# Patient Record
Sex: FEMALE | Race: WHITE | NOT HISPANIC OR LATINO | Employment: FULL TIME | URBAN - METROPOLITAN AREA
[De-identification: names, ages, dates, MRNs, and addresses within clinical notes are randomized per-mention and may not be internally consistent; named-entity substitution may affect disease eponyms.]

---

## 2017-05-08 ENCOUNTER — APPOINTMENT (OUTPATIENT)
Dept: RADIOLOGY | Facility: MEDICAL CENTER | Age: 57
End: 2017-05-08
Attending: EMERGENCY MEDICINE
Payer: OTHER MISCELLANEOUS

## 2017-05-08 ENCOUNTER — HOSPITAL ENCOUNTER (EMERGENCY)
Facility: MEDICAL CENTER | Age: 57
End: 2017-05-08
Attending: EMERGENCY MEDICINE
Payer: OTHER MISCELLANEOUS

## 2017-05-08 VITALS
BODY MASS INDEX: 27.32 KG/M2 | OXYGEN SATURATION: 98 % | DIASTOLIC BLOOD PRESSURE: 93 MMHG | HEIGHT: 66 IN | TEMPERATURE: 99 F | SYSTOLIC BLOOD PRESSURE: 157 MMHG | HEART RATE: 88 BPM | WEIGHT: 170 LBS | RESPIRATION RATE: 16 BRPM
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
## 2017-05-08 DIAGNOSIS — M79.672 LEFT FOOT PAIN: ICD-10-CM

## 2017-05-08 DIAGNOSIS — S43.499A: ICD-10-CM

## 2017-05-08 PROCEDURE — 99284 EMERGENCY DEPT VISIT MOD MDM: CPT

## 2017-05-08 PROCEDURE — 700102 HCHG RX REV CODE 250 W/ 637 OVERRIDE(OP): Performed by: EMERGENCY MEDICINE

## 2017-05-08 PROCEDURE — A9270 NON-COVERED ITEM OR SERVICE: HCPCS | Performed by: EMERGENCY MEDICINE

## 2017-05-08 PROCEDURE — 307740 HCHG GREEN TRAUMA TEAM SERVICES

## 2017-05-08 PROCEDURE — 73630 X-RAY EXAM OF FOOT: CPT | Mod: LT

## 2017-05-08 PROCEDURE — 73030 X-RAY EXAM OF SHOULDER: CPT | Mod: LT

## 2017-05-08 PROCEDURE — 73560 X-RAY EXAM OF KNEE 1 OR 2: CPT | Mod: RT

## 2017-05-08 RX ORDER — IBUPROFEN 800 MG/1
800 TABLET ORAL EVERY 8 HOURS PRN
Qty: 15 TAB | Refills: 3 | Status: SHIPPED | OUTPATIENT
Start: 2017-05-08 | End: 2020-03-10

## 2017-05-08 RX ORDER — CYCLOBENZAPRINE HCL 10 MG
10 TABLET ORAL 3 TIMES DAILY PRN
Qty: 15 TAB | Refills: 0 | Status: SHIPPED | OUTPATIENT
Start: 2017-05-08

## 2017-05-08 RX ORDER — IBUPROFEN 600 MG/1
600 TABLET ORAL ONCE
Status: COMPLETED | OUTPATIENT
Start: 2017-05-08 | End: 2017-05-08

## 2017-05-08 RX ADMIN — IBUPROFEN 600 MG: 600 TABLET, FILM COATED ORAL at 15:49

## 2017-05-08 ASSESSMENT — LIFESTYLE VARIABLES: DO YOU DRINK ALCOHOL: NO
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
## 2017-05-08 NOTE — ED AVS SNAPSHOT
[employer] Access Code: [email or web address]  Expires: 6/7/2017  3:43 PM    Your email address is not on file at [employer].  Email Addresses are required for you to sign up for [employer], please contact [phone] to verify your personal information and to provide your email address prior to attempting to register for [employer].    [facility]  [address] [place] [address]    [employer]  A secure, online tool to manage your health information     [employer]’s [employer]® is a secure, online tool that connects you to your personalized health information from the privacy of your home -- day or night - making it very easy for you to manage your healthcare. Once the activation process is completed, you can even access your medical information using the [employer] sunil, which is available for free in the Apple Sunil store or Google Play store.     To learn more about [employer], visit [email or web address]    There are two levels of access available (as shown below):   My Chart Features  [facility] Primary Care Doctor [facility]  Specialists [facility]  Urgent  Care Non-[facility] Primary Care Doctor   Email your healthcare team securely and privately 24/7 X X X    Manage appointments: schedule your next appointment; view details of past/upcoming appointments X      Request prescription refills. X      View recent personal medical records, including lab and immunizations X X X X   View health record, including health history, allergies, medications X X X X   Read reports about your outpatient visits, procedures, consult and ER notes X X X X   See your discharge summary, which is a recap of your hospital and/or ER visit that includes your diagnosis, lab results, and care plan X X  X     How to register for L[employer]:  Once your e-mail address has been verified, follow the following steps to sign up for [employer].     1. Go to  [email or web address]  2. Click on the Sign Up Now box, which takes you to the New Member Sign Up page. You  will need to provide the following information:  a. Enter your [employer] Access Code exactly as it appears at the top of this page. (You will not need to use this code after you’ve completed the sign-up process. If you do not sign up before the expiration date, you must request a new code.)   b. Enter your date of birth.   c. Enter your home email address.   d. Click Submit, and follow the next screen’s instructions.  3. Create a Undat ID. This will be your [employer] login ID and cannot be changed, so think of one that is secure and easy to remember.  4. Create a [employer] password. You can change your password at any time.  5. Enter your Password Reset Question and Answer. This can be used at a later time if you forget your password.   6. Enter your e-mail address. This allows you to receive e-mail notifications when new information is available in [employer].  7. Click Sign Up. You can now view your health information.    For assistance activating your [employer] account, call [phone]

## 2017-05-08 NOTE — ED AVS SNAPSHOT
5/8/2017    Zoran Neumannarm  [address] [place] [address]    Dear Zoran:    [facility] wants to ensure your discharge home is safe and you or your loved ones have had all of your questions answered regarding your care after you leave the hospital.    Below is a list of resources and contact information should you have any questions regarding your hospital stay, follow-up instructions, or active medical symptoms.    Questions or Concerns Regarding… Contact   Medical Questions Related to Your Discharge  (7 days a week, 8am-5pm) Contact a Nurse Care Coordinator   [phone]   Medical Questions Not Related to Your Discharge  (24 hours a day / 7 days a week)  Contact the Nurse Health Line   [phone]    Medications or Discharge Instructions Refer to your discharge packet   or contact your [facility] Primary Care Provider   [phone]   Follow-up Appointment(s) Schedule your appointment via GoVoluntr   or contact Scheduling [phone]   Billing Review your statement via GoVoluntr  or contact Billing [phone]   Medical Records Review your records via GoVoluntr   or contact Medical Records [phone]     You may receive a telephone call within two days of discharge. This call is to make certain you understand your discharge instructions and have the opportunity to have any questions answered. You can also easily access your medical information, test results and upcoming appointments via the GoVoluntr free online health management tool. You can learn more and sign up at Mobile Event Guide/GoVoluntr. For assistance setting up your GoVoluntr account, please call [phone].    Once again, we want to ensure your discharge home is safe and that you have a clear understanding of any next steps in your care. If you have any questions or concerns, please do not hesitate to contact us, we are here for you. Thank you for choosing [facility] for your healthcare needs.    Sincerely,    Your [facility] Healthcare Team

## 2020-03-10 ENCOUNTER — HOSPITAL ENCOUNTER (EMERGENCY)
Facility: MEDICAL CENTER | Age: 60
End: 2020-03-10
Attending: EMERGENCY MEDICINE
Payer: COMMERCIAL

## 2020-03-10 ENCOUNTER — APPOINTMENT (OUTPATIENT)
Dept: RADIOLOGY | Facility: MEDICAL CENTER | Age: 60
End: 2020-03-10
Attending: EMERGENCY MEDICINE
Payer: COMMERCIAL

## 2020-03-10 VITALS
TEMPERATURE: 97.7 F | SYSTOLIC BLOOD PRESSURE: 162 MMHG | HEART RATE: 69 BPM | OXYGEN SATURATION: 96 % | BODY MASS INDEX: 28.17 KG/M2 | WEIGHT: 169.09 LBS | HEIGHT: 65 IN | DIASTOLIC BLOOD PRESSURE: 88 MMHG | RESPIRATION RATE: 16 BRPM

## 2020-03-10 DIAGNOSIS — S62.002A CLOSED NONDISPLACED FRACTURE OF SCAPHOID OF LEFT WRIST, UNSPECIFIED PORTION OF SCAPHOID, INITIAL ENCOUNTER: ICD-10-CM

## 2020-03-10 DIAGNOSIS — S69.92XA INJURY OF LEFT WRIST, INITIAL ENCOUNTER: ICD-10-CM
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
## 2020-03-10 PROCEDURE — A9270 NON-COVERED ITEM OR SERVICE: HCPCS | Performed by: EMERGENCY MEDICINE

## 2020-03-10 PROCEDURE — 99284 EMERGENCY DEPT VISIT MOD MDM: CPT

## 2020-03-10 PROCEDURE — 700102 HCHG RX REV CODE 250 W/ 637 OVERRIDE(OP): Performed by: EMERGENCY MEDICINE

## 2020-03-10 PROCEDURE — 73110 X-RAY EXAM OF WRIST: CPT | Mod: LT

## 2020-03-10 RX ORDER — IBUPROFEN 400 MG/1
400 TABLET ORAL EVERY 8 HOURS PRN
Qty: 20 TAB | Refills: 0 | Status: SHIPPED | OUTPATIENT
Start: 2020-03-10

## 2020-03-10 RX ORDER — IBUPROFEN 200 MG
400 TABLET ORAL ONCE
Status: COMPLETED | OUTPATIENT
Start: 2020-03-10 | End: 2020-03-10

## 2020-03-10 RX ORDER — ACETAMINOPHEN 500 MG
1000 TABLET ORAL ONCE
Status: COMPLETED | OUTPATIENT
Start: 2020-03-10 | End: 2020-03-10

## 2020-03-10 RX ORDER — ACETAMINOPHEN 500 MG
1000 TABLET ORAL EVERY 8 HOURS PRN
Qty: 20 TAB | Refills: 0 | Status: SHIPPED | OUTPATIENT
Start: 2020-03-10

## 2020-03-10 RX ADMIN — ACETAMINOPHEN 1000 MG: 500 TABLET ORAL at 20:22

## 2020-03-10 RX ADMIN — IBUPROFEN 400 MG: 200 TABLET, FILM COATED ORAL at 20:22

## 2020-03-10 ASSESSMENT — LIFESTYLE VARIABLES: DO YOU DRINK ALCOHOL: NO

## 2020-03-10 NOTE — LETTER
"  FORM C-4:  EMPLOYEE’S CLAIM FOR COMPENSATION/ REPORT OF INITIAL TREATMENT  EMPLOYEE’S CLAIM - PROVIDE ALL INFORMATION REQUESTED   First Name Zoran Last Name Charlotte Birthdate 1960  Sex female Claim Number   Home Employee Address [address]  [facility]                                     Zip  [address] Height  1.651 m (5' 5\") Weight  76.7 kg (169 lb 1.5 oz) [facility]     Mailing Employee Address [address]   [facility] [place]               Zip  [address] Telephone  [phone] (home)  Primary Language Spoken   Insurer   Third Party   HOSSEIN CLAIMS [id number] 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 Be specific and answer in detail. Use additional sheet if necessary)  Slipped on water at work + fell on wrist.   If you believe that you have an occupational disease, when did you first have knowledge of the disability and it relationship to your employment? N/A Witnesses to the Accident  [place]ornn[place]   Nature of Injury or Occupational Disease  Workers' Compensation Part(s) of Body Injured or Affected  Wrist (L) and Hand (L), N/A, N/A    I CERTIFY THAT THE ABOVE IS TRUE AND CORRECT TO THE BEST OF MY KNOWLEDGE AND THAT I HAVE PROVIDED THIS INFORMATION IN ORDER TO OBTAIN THE BENEFITS OF [place] IN[place]).  I HEREBY AUTHORIZE ANY PHYSICIAN, CHIROPRACTOR, SURGEON, PRACTITIONER, OR OTHER PERSON, ANY HOSPITAL, INCLUDING [facility] OR [facility], ANY MEDICAL SERVICE ORGANIZATION, ANY INSURANCE COMPANY, OR OTHER INSTITUTION OR ORGANIZATION TO RELEASE TO EACH OTHER, ANY MEDICAL OR OTHER INFORMATION, INCLUDING BENEFITS PAID OR PAYABLE, PERTINENT TO THIS INJURY OR DISEASE, EXCEPT INFORMATION RELATIVE TO DIAGNOSIS, TREATMENT AND/OR COUNSELING FOR AIDS, PSYCHOLOGICAL CONDITIONS, ALCOHOL OR CONTROLLED SUBSTANCES, FOR WHICH I MUST GIVE SPECIFIC AUTHORIZATION.  A PHOTOSTAT OF THIS AUTHORIZATION SHALL BE AS VALID AS THE ORIGINAL.  Date 03/10/2020                  [facility]                           Employee’s Signature   THIS REPORT MUST BE COMPLETED AND MAILED WITHIN 3 WORKING DAYS OF TREATMENT   [facility], EMERGENCY DEPT                                                                             Name of Facility [facility]   Date  3/10/2020 Diagnosis  (W18.30XA) Fall from ground level  (S69.92XA) Injury of left wrist, initial encounter  (S62.002A) Closed nondisplaced fracture of scaphoid of left wrist, unspecified portion of scaphoid, initial encounter Is there evidence the injured employee was under the influence of alcohol and/or another controlled substance at the time of accident?   Hour  8:22 PM Description of Injury or Disease  Fall from ground level  Injury of left wrist, initial encounter  Closed nondisplaced fracture of scaphoid of left wrist, unspecified portion of scaphoid, initial encounter     Treatment  Slip and fall on wet floor at work onto outstretched left hand, x-ray thankfully shows no obvious broken bones or dislocations we will plan splint and follow-up in 1 week for possible occult scaphoid fracture  Have you advised the patient to remain off work five days or more?       "   No   X-Ray Findings  Negative  Comments:Negative for any fracture dislocation but tenderness to the wrist over the scaphoid bone suspicious for occult scaphoid fracture If Yes   From Date    To Date      From information given by the employee, together with medical evidence, can you directly connect this injury or occupational disease as job incurred? Yes If No, is employee capable of: Full Duty  No Modified Duty  Yes   Is additional medical care by a physician indicated? Yes  Comments:Recommend repeat x-ray in 1 week If Modified Duty, Specify any Limitations / Restrictions   Left hand/wrist needs to be immobilized in splint for 1 week   Do you know of any previous injury or disease contributing to this condition or occupational disease? No    Date 3/10/2020 Print Doctor’s Name Victoriano Mccarthy certify the employer’s copy of this form was mailed on:   Address [address] [place] [address] [id number] [phone] INSURER’S USE ONLY   Provider’s Tax ID Number [id number] Telephone Dept: [phone]    Doctor’s Signature e-SignVICTORIANO MCCARTHY M.D. Degree  MD      Form C-4 (rev.10/07)                                                                         BRIEF DESCRIPTION OF RIGHTS AND BENEFITS  (Pursuant to [place]RS 616C.050)    Notice of Injury or Occupational Disease (Incident Report Form C-1): If an injury or occupational disease (OD) arises out of and in the course of employment, you must provide written notice to your employer as soon as practicable, but no later than 7 days after the accident or OD. Your employer shall maintain a sufficient supply of the required forms.    Claim for Compensation (Form C-4): If medical treatment is sought, the form C-4 is available at the place of initial treatment. A completed \"Claim for Compensation\" (Form C-4) must be filed within 90 days after an accident or OD. The treating physician or chiropractor must, within 3 working days after treatment, complete and mail to the " employer, the employer's insurer and third-party , the Claim for Compensation.    Medical Treatment: If you require medical treatment for your on-the-job injury or OD, you may be required to select a physician or chiropractor from a list provided by your workers’ compensation insurer, if it has contracted with an Organization for Managed Care (MCO) or Preferred Provider Organization (PPO) or providers of health care. If your employer has not entered into a contract with an MCO or PPO, you may select a physician or chiropractor from the Panel of Physicians and Chiropractors. Any medical costs related to your industrial injury or OD will be paid by your insurer.    Temporary Total Disability (TTD): If your doctor has certified that you are unable to work for a period of at least 5 consecutive days, or 5 cumulative days in a 20-day period, or places restrictions on you that your employer does not accommodate, you may be entitled to TTD compensation.    Temporary Partial Disability (TPD): If the wage you receive upon reemployment is less than the compensation for TTD to which you are entitled, the insurer may be required to pay you TPD compensation to make up the difference. TPD can only be paid for a maximum of 24 months.    Permanent Partial Disability (PPD): When your medical condition is stable and there is an indication of a PPD as a result of your injury or OD, within 30 days, your insurer must arrange for an evaluation by a rating physician or chiropractor to determine the degree of your PPD. The amount of your PPD award depends on the date of injury, the results of the PPD evaluation and your age and wage.    Permanent Total Disability (PTD): If you are medically certified by a treating physician or chiropractor as permanently and totally disabled and have been granted a PTD status by your insurer, you are entitled to receive monthly benefits not to exceed 66 2/3% of your average monthly wage. The  amount of your PTD payments is subject to reduction if you previously received a PPD award.    Vocational Rehabilitation Services: You may be eligible for vocational rehabilitation services if you are unable to return to the job due to a permanent physical impairment or permanent restrictions as a result of your injury or occupational disease.    Transportation and Per Bayron Reimbursement: You may be eligible for travel expenses and per bayron associated with medical treatment.    Reopening: You may be able to reopen your claim if your condition worsens after claim closure.     Appeal Process: If you disagree with a written determination issued by the insurer or the insurer does not respond to your request, you may appeal to the Department of Administration, , by following the instructions contained in your determination letter. You must appeal the determination within 70 days from the date of the determination letter at [address], [address], [place], [place] [address], or [address], [address], [place], [place] [address]. If you disagree with the  decision, you may appeal to the Department of Administration, . You must file your appeal within 30 days from the date of the  decision letter at [address], [address], [place], [place] [address], or [address], [address], [place], [place] [address]. If you disagree with a decision of an , you may file a petition for judicial review with the District Court. You must do so within 30 days of the Appeal Officer’s decision. You may be represented by an  at your own expense or you may contact the [facility] for possible representation.    [place]  for Injured Workers (NAIW): If you disagree with a  decision, you may request that NAIW represent you without charge at an  Hearing. For information regarding denial of benefits, you may  contact the [facility] at: [address], [address], [place] [address], [phone], or [address], [place] [address], [phone]    To File a Complaint with the Division: If you wish to file a complaint with the  of the Division of Industrial Relations (DIR),  please contact the Workers’ Compensation Section, [address], [address], [place], [place] [address], telephone ([phone]) [phone], or [address], [address], [place], [place] [address], telephone [phone].    For assistance with Workers’ Compensation Issues: You may contact the Office of the Governor Consumer Health Assistance, [address], [place], [place] [address], Toll Free 1-227.773.2562, Web site: http://govcha.[facility].nv., E-mail [email or web address].[email or web address].  D-2 (rev. 06/18)              __________________________________________________________________                                    ____03/10/2020____            Employee Name / Signature                                                                                                                            Date

## 2020-03-11 NOTE — DISCHARGE INSTRUCTIONS
You were seen and evaluated in the Emergency Department at [facility] for:     Left wrist pain after a fall    You had the following tests and studies:    Thankfully x-ray shows no broken bones or dislocations, but sometimes there is a bone in your wrist called the scaphoid bone which can not show up on x-rays for up to 1 week.    You received the following medications:    Acetaminophen and ibuprofen.    You received the following prescriptions:    Acetaminophen and ibuprofen, take as prescribed.  ----------------------------    Please make sure to follow up with:    Your occupational health provider and/or Dr. Becerra from orthopedics for repeat x-ray in 1 week, but if you have any worsening pain or swelling or fevers or any other concerns return to the ER immediately.    Good luck, we hope you get better soon!  Use the splint provided until you are pain-free and have a repeat x-ray.  ----------------------------    We always encourage patients to return IMMEDIATELY if they have:  Increased or changing pain, passing out, fevers over 100.4 (taken in your mouth or rectally) for more than 2 days, redness or swelling of skin or tissues, feeling like your heart is beating fast, chest pain that is new or worsening, trouble breathing, feeling like your throat is closing up and can not breath, inability to walk, weakness of any part of your body, new dizziness, severe bleeding that won't stop from any part of your body, if you can't eat or drink, or if you have any other concerns.   If you feel worse, please know that you can always return with any questions, concerns, worse symptoms, or you are feeling unsafe. We certainly cannot say for sure that we have ruled out every illness or dangerous disease, but we feel that at this specific time, your exam, tests, and vital signs like heart rate and blood pressure are safe for discharge.

## 2020-03-11 NOTE — ED PROVIDER NOTES
"ED PROVIDER NOTE     Scribed for Victoriano Noe M.D. by Nichol Zuñiga. 3/10/2020, 7:36 PM.    CHIEF COMPLAINT  Chief Complaint   Patient presents with   • T-5000 Extremity Pain   • T-5000 GLF       HPI    Primary care provider: Not on file  Means of arrival: walk in  History obtained from: Patient  History limited by: None    Zoran Porter is a 59 y.o. female with a history of hypertension, diabetes, and hyperlipidemia who presents to the ED for evaluation following a mechanical ground level fall which occurred around 6 PM tonight at work. The patient notes the fall occurred after she slipped on a puddle of water on the floor and landed on her out stretched left hand. She reports onset of left wrist pain following the incident, but denies any head trauma or loss of consciousness. The patient has no associated symptoms of numbness or elbow pain. She is right hand dominant. The patient does have a history of diabetes, but reports her most recent A1C was 5.6.     REVIEW OF SYSTEMS  Constitutional: Negative for loss of consciousness.   Musculoskeletal: Positive for left wrist pain. Negative for elbow pain.   Neurological: Negative for numbness.     PAST MEDICAL HISTORY   has a past medical history of Diabetes, Diabetes (CMS-HCC), Hyperlipidemia, and Hypertension.    PAST FAMILY HISTORY  No family history on file.    SOCIAL HISTORY  Social History     Tobacco Use   • Smoking status: Never Smoker   Substance and Sexual Activity   • Alcohol use: No   • Drug use: No   • Sexual activity: None noted       SURGICAL HISTORY   has a past surgical history that includes other cardiac surgery.    CURRENT MEDICATIONS  Home Medications    **Home medications have not yet been reviewed for this encounter**         ALLERGIES  Allergies   Allergen Reactions   • Nkda [No Known Drug Allergy]        PHYSICAL EXAM  VITAL SIGNS: /81   Pulse 87   Temp 36.5 °C (97.7 °F) (Temporal)   Resp 14   Ht 1.651 m (5' 5\")   Wt 76.7 kg " (169 lb 1.5 oz)   SpO2 97%   BMI 28.14 kg/m²    Pulse ox interpretation: On room air, I interpret this pulse ox as normal.  Constitutional: No distress. Well-nourished.  HENT: Head is atraumatic. Mucous membranes moist.   Eyes: Conjunctivae are normal. EOMI.   Respiratory: No respiratory distress, wheezes, or rhonchi.    Cardiovascular: RRR, no m/r/g.  Abdomen: Soft, nontender.  Musculoskeletal: Left wrist tenderness, no snuffbox tenderness, normal sensation and capillary refill, no open wounds.   Neurological: Alert. No focal weakness or asymmetry.   Skin: No rash. No Pallor.   Psych: Appropriate mood. Normal affect.    DIAGNOSTIC STUDIES / PROCEDURES    RADIOLOGY  DX-WRIST-COMPLETE 3+ LEFT   Final Result      No evidence of fracture or dislocation.        The radiologist's interpretations of all radiological studies have been reviewed by me.          COURSE & MEDICAL DECISION MAKING    This is a 59 y.o. female who presents with left wrist pain after slip and fall at work onto outstretched hand.     Differential Diagnosis includes but is not limited to:  Fall, contusion, fracture, sprain, dislocation    ED Course:  7:37 PM Patient presents to the ED with left wrist pain. Physical exam indicates left wrist tenderness, but the patient is neurovascularly intact. Will order DX-Wrist (left) to evaluate. Patient will be treated with Tylenol 1000 mg and Motrin 400 mg for pain.     8:10 PM - Reviewed x-ray imaging which shows no sign of fracture or dislocation. Wrist pain present plan presumed occult scaphoid fracture, splint, recheck 1 week.     8:11 PM - Patient was reevaluated at bedside. She is resting comfortably. Vitals are stable. Discussed x-ray results with the patient and informed her there are no signs of fracture or dislocation, but there is still a concern for possible scaphoid fracture. The patient was informed she will be placed in a cock-up splint and will require follow up with Orthopedics/Occupational  Health in one week. She is recommended to continue taking NSAIDs as needed to manage the pain. The patient is instructed to return to the ED for worsening wrist pain, fevers, or any other concerning symptoms. She is understanding and agreeable to discharge.     Medications   acetaminophen (TYLENOL) tablet 1,000 mg (1,000 mg Oral Given 3/10/20 2022)   ibuprofen (MOTRIN) tablet 400 mg (400 mg Oral Given 3/10/20 2022)       FINAL IMPRESSION  1. Fall from ground level    2. Injury of left wrist, initial encounter    3. Closed nondisplaced fracture of scaphoid of left wrist, unspecified portion of scaphoid, initial encounter        PRESCRIPTIONS  Discharge Medication List as of 3/10/2020  8:28 PM      START taking these medications    Details   acetaminophen (TYLENOL) 500 MG Tab Take 2 Tabs by mouth every 8 hours as needed for Moderate Pain., Disp-20 Tab, R-0, Print Rx Paper             FOLLOW UP  [facility] Emergency Dept  [address] [facility] [address] [id number] [phone]  Today  If you have ANY new or worse symptoms!    [address] [id number] [phone]  Schedule an appointment as soon as possible for a visit in 1 week          -DISCHARGE-       The patient is referred to a primary physician for blood pressure management, diabetic screening, and for all other preventative health concerns.     Pertinent Imaging studies reviewed and verified by myself, as well as nursing notes and the patient's past medical, family, and social histories (See chart for details).    Results, exam findings, clinical impression, presumed diagnosis, treatment options, and strict return precautions were discussed with the patient and family, and they verbalized understanding, agreed with, and appreciated the plan of care.    INichol (Scribe), am scribing for, and in the presence of, Victoriano Noe M.D..    Electronically signed by: Nichol Zuñiga  (Scribe), 3/10/2020    IVictoriano M.D. personally performed the services described in this documentation, as scribed by Nichol Zuñiga in my presence, and it is both accurate and complete.    E.    Portions of this record were made with voice recognition software.  Despite my review, spelling/grammar/context errors may still remain.  Interpretation of this chart should be taken in this context.    The note accurately reflects work and decisions made by me.  Victoriano Noe M.D.  3/11/2020  10:30 AM

## 2020-03-11 NOTE — ED NOTES
Velcro splint applied to L wrist, CMS+, cap refill <2 sec.  Patient educated on donning and doffing, verbalized understanding.

## 2020-03-17 ENCOUNTER — APPOINTMENT (OUTPATIENT)
Dept: RADIOLOGY | Facility: IMAGING CENTER | Age: 60
End: 2020-03-17
Attending: NURSE PRACTITIONER
Payer: COMMERCIAL

## 2020-03-17 ENCOUNTER — OCCUPATIONAL MEDICINE (OUTPATIENT)
Dept: URGENT CARE | Facility: CLINIC | Age: 60
End: 2020-03-17
Payer: COMMERCIAL

## 2020-03-17 VITALS
DIASTOLIC BLOOD PRESSURE: 70 MMHG | RESPIRATION RATE: 14 BRPM | BODY MASS INDEX: 27.49 KG/M2 | OXYGEN SATURATION: 98 % | SYSTOLIC BLOOD PRESSURE: 104 MMHG | WEIGHT: 165 LBS | HEIGHT: 65 IN | HEART RATE: 78 BPM
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
## 2020-03-17 DIAGNOSIS — S62.002D CLOSED NONDISPLACED FRACTURE OF SCAPHOID OF LEFT WRIST WITH ROUTINE HEALING, UNSPECIFIED PORTION OF SCAPHOID, SUBSEQUENT ENCOUNTER: ICD-10-CM

## 2020-03-17 PROCEDURE — 73110 X-RAY EXAM OF WRIST: CPT | Mod: TC,LT | Performed by: NURSE PRACTITIONER

## 2020-03-17 PROCEDURE — 73130 X-RAY EXAM OF HAND: CPT | Mod: TC,LT | Performed by: NURSE PRACTITIONER

## 2020-03-17 PROCEDURE — 99204 OFFICE O/P NEW MOD 45 MIN: CPT | Mod: 29 | Performed by: NURSE PRACTITIONER

## 2020-03-17 ASSESSMENT — ENCOUNTER SYMPTOMS
RESPIRATORY NEGATIVE: 1
MYALGIAS: 1
CONSTITUTIONAL NEGATIVE: 1
NEUROLOGICAL NEGATIVE: 1
CARDIOVASCULAR NEGATIVE: 1
PSYCHIATRIC NEGATIVE: 1

## 2020-03-17 ASSESSMENT — PAIN SCALES - GENERAL: PAINLEVEL: 6=MODERATE PAIN

## 2020-03-17 NOTE — LETTER
[facility]  [address]  [place] [address]  Phone:  [phone] - Fax:  [phone]   [facility] Progress Report and Disability Certification  Date of Service: 3/17/2020   No Show:  No  Date / Time of Next Visit:  Discharged/Transfer Care to Orthopedics   Claim Information   Patient Name: Zoran Porter  Claim Number:     Employer:   [employer] Date of Injury: 3/10/2020     Insurer / TPA: [employer]  ID / SSN:     Occupation: Prep  Diagnosis: The encounter diagnosis was Fall from ground level.    Medical Information   Related to Industrial Injury? Yes    Subjective Complaints:  DOI 3/10/20: Zoran Porter is a 59 y.o. female with a history of hypertension, diabetes, and hyperlipidemia who presents to the ED for evaluation following a mechanical ground level fall which occurred around 6 PM tonight at work. The patient notes the fall occurred after she slipped on a puddle of water on the floor and landed on her out stretched left hand. She reports onset of left wrist pain following the incident, but denies any head trauma or loss of consciousness. The patient has no associated symptoms of numbness or elbow pain. She is right hand dominant. The patient does have a history of diabetes, but reports her most recent A1C was 5.6.    Today mild improvement. Pain is intermittent, triggered with use, and isolated to the wrist. Patient denies numbness, tingling, or weakness. Patient is using occasional Ibuprofen with moderate relief. Patient is using ice as needed with swelling. Patient has been wearing her wrist splint most of the day with some relief of symptoms. 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 Patient has not been to see an Orthopedics for further evaluation and management of symptoms. ER visit patient was diagnosed with scaphoid fracture, not seen on x-ray. Will  repeat X-rays at this visit. X-ray results reviewed with patient. Plan of care discussed with patient.    Objective Findings: Left wrist tenderness, no snuffbox tenderness, normal sensation and capillary refill, no open wounds.  Neg erythema or warmth noted. Moderate diffuse  bruising present to the wrist and hand. ROM intact, triggers pain    3/10/2020 7:44 PM     HISTORY/REASON FOR EXAM:  Left wrist pain after fall        TECHNIQUE/EXAM DESCRIPTION AND NUMBER OF VIEWS:  4 views of the LEFT wrist.     COMPARISON:  No comparison available     FINDINGS:  Bone mineralization is normal.  There is no evidence of fracture or dislocation.  Soft tissues are normal.     IMPRESSION:     No evidence of fracture or dislocation.   Pre-Existing Condition(s):     Assessment:   Condition Same    Status: Discharged / Care Transfer  Permanent Disability:No    Plan: Diagnostics    Diagnostics: X-ray    Comments:  Follow-up in 3 weeks, if not seen by Orthopedics  Transfer care to Orthopedics, referral placed  Continue with wrist splint with small breaks as needed for comfort  Continue with ice as needed  Continue with OTC Ibuprofen as needed     Disability Information   Status: Released to Restricted Duty    From:  3/17/2020  Through:   Restrictions are: Temporary   Physical Restrictions   Sitting:    Standing:    Stooping:    Bending:      Squatting:    Walking:    Climbing:    Pushin hrs/day  Comments:Left hand/wrist   Pullin hrs/day  Comments:Left hand/wrist Other:    Reaching Above Shoulder (L):   Reaching Above Shoulder (R):       Reaching Below Shoulder (L):    Reaching Below Shoulder (R):      Not to exceed Weight Limits   Carrying(hrs):   Weight Limit(lb):   Comments:No more than 2lbs Left hand/wrist only Lifting(hrs):   Weight  Limit(lb):   Comments:No more than 2lbs Left hand/wrist only   Comments:      Repetitive Actions   Hands: i.e. Fine Manipulations from Grasping: < or = to 1 hr/day  Comments:Left hand/wrist  only   Feet: i.e. Operating Foot Controls:     Driving / Operate Machinery:     Physician Name: ADAM Del Rosario Physician Signature:   e-Signature: Dr. Filippo Morales, Medical Director   Clinic Name / Location: [address], [address]  [place] [address] Clinic Phone Number: Dept: [phone]   Appointment Time: 2:15 Pm Visit Start Time: 1:38 PM   Check-In Time:  1:33 Pm Visit Discharge Time:  3 PM   Original-Treating Physician or Chiropractor    Page 2-Insurer/TPA    Page 3-Employer    Page 4-Employee

## 2020-03-17 NOTE — PROGRESS NOTES
Subjective:      Zoran Porter is a 59 y.o. female who presents with Follow-Up ( DOI 03/10/2020 Wrist (L) and Hand (L) Better -  06)      DOI 3/10/20: Zoran Porter is a 59 y.o. female with a history of hypertension, diabetes, and hyperlipidemia who presents to the ED for evaluation following a mechanical ground level fall which occurred around 6 PM tonight at work. The patient notes the fall occurred after she slipped on a puddle of water on the floor and landed on her out stretched left hand. She reports onset of left wrist pain following the incident, but denies any head trauma or loss of consciousness. The patient has no associated symptoms of numbness or elbow pain. She is right hand dominant. The patient does have a history of diabetes, but reports her most recent A1C was 5.6.    Today mild improvement. Pain is intermittent, triggered with use, and isolated to the wrist. Patient denies numbness, tingling, or weakness. Patient is using occasional Ibuprofen with moderate relief. Patient is using ice as needed with swelling. Patient has been wearing her wrist splint most of the day with some relief of symptoms. 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 Patient has not been to see an Orthopedics for further evaluation and management of symptoms. ER visit patient was diagnosed with scaphoid fracture, not seen on x-ray. Will repeat X-rays at this visit. X-ray results reviewed with patient. Plan of care discussed with patient.      HPI    Review of Systems   Constitutional: Negative.    Respiratory: Negative.    Cardiovascular: Negative.    Musculoskeletal: Positive for joint pain and myalgias.   Skin:        Moderate bruising and swelling present wrist and hand   Neurological: Negative.    Psychiatric/Behavioral: Negative.         ROS: All systems were reviewed on intake form, form was reviewed and signed. See scanned documents in media.  "Pertinent positives and negatives included in HPI.    PMH: No pertinent past medical history to this problem  MEDS: Medications were reviewed in Epic  ALLERGIES:   Allergies   Allergen Reactions   • Nkda [No Known Drug Allergy]      SOCHX: Works as Cook at [employer]  FH: No pertinent family history to this problem       Objective:     /70   Pulse 78   Resp 14   Ht 1.651 m (5' 5\")   Wt 74.8 kg (165 lb)   SpO2 98%   BMI 27.46 kg/m²      Physical Exam  Constitutional:       General: She is not in acute distress.     Appearance: Normal appearance. She is not ill-appearing.   Cardiovascular:      Rate and Rhythm: Normal rate and regular rhythm.      Pulses: Normal pulses.   Pulmonary:      Effort: Pulmonary effort is normal.   Musculoskeletal: Normal range of motion.         General: Swelling, tenderness and signs of injury present. No deformity.   Skin:     General: Skin is warm and dry.      Capillary Refill: Capillary refill takes less than 2 seconds.      Findings: Bruising present. No erythema.   Neurological:      General: No focal deficit present.      Mental Status: She is alert and oriented to person, place, and time.      Cranial Nerves: No cranial nerve deficit.      Sensory: No sensory deficit.      Motor: No weakness.      Coordination: Coordination normal.      Gait: Gait normal.      Deep Tendon Reflexes: Reflexes normal.   Psychiatric:         Mood and Affect: Mood normal.         Behavior: Behavior normal.         Left wrist tenderness, no snuffbox tenderness, normal sensation and capillary refill, no open wounds.  Neg erythema or warmth noted. Moderate diffuse  bruising present to the wrist and hand. ROM intact, triggers pain    3/10/2020 7:44 PM     HISTORY/REASON FOR EXAM:  Left wrist pain after fall        TECHNIQUE/EXAM DESCRIPTION AND NUMBER OF VIEWS:  4 views of the LEFT wrist.     COMPARISON:  No comparison available     FINDINGS:  Bone mineralization is normal.  There " is no evidence of fracture or dislocation.  Soft tissues are normal.     IMPRESSION:     No evidence of fracture or dislocation.       Assessment/Plan:       1. Fall from ground level    - REFERRAL TO ORTHOPEDICS  - DX-HAND 3+ LEFT; Future  - DX-WRIST-COMPLETE 3+ LEFT; Future  - REFERRAL TO RADIOLOGY    2. Closed nondisplaced fracture of scaphoid of left wrist with routine healing, unspecified portion of scaphoid, subsequent encounter    Follow-up in 3 weeks, if not seen by Orthopedics   Transfer care to Orthopedics, referral placed   Continue with wrist splint with small breaks as needed for comfort   Continue with ice as needed   Continue with OTC Ibuprofen as needed

## 2020-06-06 ENCOUNTER — HOSPITAL ENCOUNTER (EMERGENCY)
Facility: MEDICAL CENTER | Age: 60
End: 2020-06-06
Attending: EMERGENCY MEDICINE
Payer: MEDICAID

## 2020-06-06 ENCOUNTER — APPOINTMENT (OUTPATIENT)
Dept: RADIOLOGY | Facility: MEDICAL CENTER | Age: 60
End: 2020-06-06
Attending: EMERGENCY MEDICINE
Payer: MEDICAID

## 2020-06-06 VITALS
SYSTOLIC BLOOD PRESSURE: 141 MMHG | TEMPERATURE: 97 F | WEIGHT: 172.18 LBS | OXYGEN SATURATION: 96 % | HEART RATE: 64 BPM | BODY MASS INDEX: 27.02 KG/M2 | HEIGHT: 67 IN | DIASTOLIC BLOOD PRESSURE: 69 MMHG | RESPIRATION RATE: 16 BRPM

## 2020-06-06 DIAGNOSIS — R10.31 RIGHT LOWER QUADRANT ABDOMINAL PAIN: ICD-10-CM

## 2020-06-06 LAB
ALBUMIN SERPL BCP-MCNC: 4.4 G/DL (ref 3.2–4.9)
ALBUMIN/GLOB SERPL: 1.6 G/DL
ALP SERPL-CCNC: 85 U/L (ref 30–99)
ALT SERPL-CCNC: 19 U/L (ref 2–50)
ANION GAP SERPL CALC-SCNC: 11 MMOL/L (ref 7–16)
APPEARANCE UR: CLEAR
AST SERPL-CCNC: 23 U/L (ref 12–45)
BASOPHILS # BLD AUTO: 0.2 % (ref 0–1.8)
BASOPHILS # BLD: 0.01 K/UL (ref 0–0.12)
BILIRUB SERPL-MCNC: 0.4 MG/DL (ref 0.1–1.5)
BILIRUB UR QL STRIP.AUTO: NEGATIVE
BUN SERPL-MCNC: 10 MG/DL (ref 8–22)
CALCIUM SERPL-MCNC: 10.2 MG/DL (ref 8.5–10.5)
CHLORIDE SERPL-SCNC: 102 MMOL/L (ref 96–112)
CO2 SERPL-SCNC: 23 MMOL/L (ref 20–33)
COLOR UR: YELLOW
CREAT SERPL-MCNC: 0.51 MG/DL (ref 0.5–1.4)
EOSINOPHIL # BLD AUTO: 0.01 K/UL (ref 0–0.51)
EOSINOPHIL NFR BLD: 0.2 % (ref 0–6.9)
ERYTHROCYTE [DISTWIDTH] IN BLOOD BY AUTOMATED COUNT: 51.9 FL (ref 35.9–50)
GLOBULIN SER CALC-MCNC: 2.7 G/DL (ref 1.9–3.5)
GLUCOSE SERPL-MCNC: 121 MG/DL (ref 65–99)
GLUCOSE UR STRIP.AUTO-MCNC: NEGATIVE MG/DL
HCT VFR BLD AUTO: 33.6 % (ref 37–47)
HGB BLD-MCNC: 11.3 G/DL (ref 12–16)
IMM GRANULOCYTES # BLD AUTO: 0.02 K/UL (ref 0–0.11)
IMM GRANULOCYTES NFR BLD AUTO: 0.3 % (ref 0–0.9)
KETONES UR STRIP.AUTO-MCNC: NEGATIVE MG/DL
LEUKOCYTE ESTERASE UR QL STRIP.AUTO: NEGATIVE
LIPASE SERPL-CCNC: 28 U/L (ref 11–82)
LYMPHOCYTES # BLD AUTO: 1.16 K/UL (ref 1–4.8)
LYMPHOCYTES NFR BLD: 18.4 % (ref 22–41)
MCH RBC QN AUTO: 32.8 PG (ref 27–33)
MCHC RBC AUTO-ENTMCNC: 33.6 G/DL (ref 33.6–35)
MCV RBC AUTO: 97.4 FL (ref 81.4–97.8)
MICRO URNS: NORMAL
MONOCYTES # BLD AUTO: 0.42 K/UL (ref 0–0.85)
MONOCYTES NFR BLD AUTO: 6.6 % (ref 0–13.4)
NEUTROPHILS # BLD AUTO: 4.7 K/UL (ref 2–7.15)
NEUTROPHILS NFR BLD: 74.3 % (ref 44–72)
NITRITE UR QL STRIP.AUTO: NEGATIVE
NRBC # BLD AUTO: 0 K/UL
NRBC BLD-RTO: 0 /100 WBC
PH UR STRIP.AUTO: 6.5 [PH] (ref 5–8)
PLATELET # BLD AUTO: 178 K/UL (ref 164–446)
PMV BLD AUTO: 10.4 FL (ref 9–12.9)
POTASSIUM SERPL-SCNC: 3.9 MMOL/L (ref 3.6–5.5)
PROT SERPL-MCNC: 7.1 G/DL (ref 6–8.2)
PROT UR QL STRIP: NEGATIVE MG/DL
RBC # BLD AUTO: 3.45 M/UL (ref 4.2–5.4)
RBC UR QL AUTO: NEGATIVE
SODIUM SERPL-SCNC: 136 MMOL/L (ref 135–145)
SP GR UR STRIP.AUTO: 1.01
UROBILINOGEN UR STRIP.AUTO-MCNC: 0.2 MG/DL
WBC # BLD AUTO: 6.3 K/UL (ref 4.8–10.8)

## 2020-06-06 PROCEDURE — 700117 HCHG RX CONTRAST REV CODE 255: Performed by: EMERGENCY MEDICINE

## 2020-06-06 PROCEDURE — 81003 URINALYSIS AUTO W/O SCOPE: CPT

## 2020-06-06 PROCEDURE — 99284 EMERGENCY DEPT VISIT MOD MDM: CPT

## 2020-06-06 PROCEDURE — 74177 CT ABD & PELVIS W/CONTRAST: CPT

## 2020-06-06 PROCEDURE — 83690 ASSAY OF LIPASE: CPT

## 2020-06-06 PROCEDURE — 85025 COMPLETE CBC W/AUTO DIFF WBC: CPT

## 2020-06-06 PROCEDURE — 80053 COMPREHEN METABOLIC PANEL: CPT

## 2020-06-06 RX ORDER — HYDROCODONE BITARTRATE AND ACETAMINOPHEN 5; 325 MG/1; MG/1
1 TABLET ORAL EVERY 6 HOURS PRN
Qty: 12 TAB | Refills: 0 | Status: SHIPPED | OUTPATIENT
Start: 2020-06-06 | End: 2020-06-09

## 2020-06-06 RX ADMIN — IOHEXOL 100 ML: 350 INJECTION, SOLUTION INTRAVENOUS at 13:12

## 2020-06-06 ASSESSMENT — PAIN DESCRIPTION - DESCRIPTORS: DESCRIPTORS: SHARP

## 2020-06-06 ASSESSMENT — LIFESTYLE VARIABLES: DOES PATIENT WANT TO STOP DRINKING: NO

## 2020-06-06 NOTE — ED PROVIDER NOTES
ED Provider Note  CHIEF COMPLAINT  Chief Complaint   Patient presents with   • Abdominal Pain     RLQ/Right inguinal region.  Onset yesterday morning 6/5.       HPI  Zoran Degroot is a 59 y.o. female who presents with right lower quadrant abdominal pain.  Pain started yesterday.  She describes it as a deep dull ache.  Movement seems to make it worse.  She denies any vomiting or diarrhea.  Had a slight decrease in appetite and only had some coffee this morning.  She denies any dysuria or hematuria.  No skin rash.  She has a history of getting her tubes tied but no other abdominal surgeries.  She denies any back pain.  Never had pain like this before.  Nothing seems to make it better or worse.    REVIEW OF SYSTEMS  See HPI for further details.  No diarrhea, black stools, vomiting, fevers, dysuria, vaginal bleeding, chest pain, shortness of breath or cough.  All other systems are negative.     PAST MEDICAL HISTORY  Past Medical History:   Diagnosis Date   • Diabetes    • Diabetes (HCC)    • Hyperlipidemia    • Hypertension        FAMILY HISTORY  [unfilled]    SOCIAL HISTORY  Social History     Socioeconomic History   • Marital status:      Spouse name: Not on file   • Number of children: Not on file   • Years of education: Not on file   • Highest education level: Not on file   Occupational History   • Not on file   Social Needs   • Financial resource strain: Not on file   • Food insecurity     Worry: Not on file     Inability: Not on file   • Transportation needs     Medical: Not on file     Non-medical: Not on file   Tobacco Use   • Smoking status: Never Smoker   • Smokeless tobacco: Never Used   Substance and Sexual Activity   • Alcohol use: No   • Drug use: No   • Sexual activity: Not on file   Lifestyle   • Physical activity     Days per week: Not on file     Minutes per session: Not on file   • Stress: Not on file   Relationships   • Social connections     Talks on phone: Not on file     Gets  "together: Not on file     Attends Evangelical service: Not on file     Active member of club or organization: Not on file     Attends meetings of clubs or organizations: Not on file     Relationship status: Not on file   • Intimate partner violence     Fear of current or ex partner: Not on file     Emotionally abused: Not on file     Physically abused: Not on file     Forced sexual activity: Not on file   Other Topics Concern   • Not on file   Social History Narrative    ** Merged History Encounter **            SURGICAL HISTORY  Past Surgical History:   Procedure Laterality Date   • OTHER CARDIAC SURGERY      AT AGE 10 YRS       CURRENT MEDICATIONS   Home Medications    **Home medications have not yet been reviewed for this encounter**         ALLERGIES  Allergies   Allergen Reactions   • Nkda [No Known Drug Allergy]        PHYSICAL EXAM  VITAL SIGNS: /68   Pulse 75   Temp 36.7 °C (98.1 °F) (Temporal)   Resp 14   Ht 1.702 m (5' 7\")   Wt 78.1 kg (172 lb 2.9 oz)   SpO2 96%   BMI 26.97 kg/m²       Constitutional:  Well developed, No acute distress, Non-toxic appearance.  Sitting on the edge of the bed.  Daughter there to help translate.  HENT: Normocephalic, Atraumatic, Bilateral external ears normal, Oropharynx moist,   Eyes: PERRL, EOMI, Conjunctiva normal  Neck: Normal range of motion, No tenderness, Supple  Cardiovascular: Normal heart rate, Normal rhythm  Thorax & Lungs: Normal breath sounds, No respiratory distress  Abdomen: Right lower quadrant tenderness to palpation, no rebound or guarding, no masses palpated, no distention  Skin: Warm, Dry, No erythema, No rash.   Back: No tenderness, No CVA tenderness.   Extremities: Intact distal pulses, No edema, No tenderness   Neurologic: Alert & oriented x 3, Normal motor function, Normal sensory function, No focal deficits noted.   Psychiatric: appropriate    Labs  Results for orders placed or performed during the hospital encounter of 06/06/20   CBC WITH " DIFFERENTIAL   Result Value Ref Range    WBC 6.3 4.8 - 10.8 K/uL    RBC 3.45 (L) 4.20 - 5.40 M/uL    Hemoglobin 11.3 (L) 12.0 - 16.0 g/dL    Hematocrit 33.6 (L) 37.0 - 47.0 %    MCV 97.4 81.4 - 97.8 fL    MCH 32.8 27.0 - 33.0 pg    MCHC 33.6 33.6 - 35.0 g/dL    RDW 51.9 (H) 35.9 - 50.0 fL    Platelet Count 178 164 - 446 K/uL    MPV 10.4 9.0 - 12.9 fL    Neutrophils-Polys 74.30 (H) 44.00 - 72.00 %    Lymphocytes 18.40 (L) 22.00 - 41.00 %    Monocytes 6.60 0.00 - 13.40 %    Eosinophils 0.20 0.00 - 6.90 %    Basophils 0.20 0.00 - 1.80 %    Immature Granulocytes 0.30 0.00 - 0.90 %    Nucleated RBC 0.00 /100 WBC    Neutrophils (Absolute) 4.70 2.00 - 7.15 K/uL    Lymphs (Absolute) 1.16 1.00 - 4.80 K/uL    Monos (Absolute) 0.42 0.00 - 0.85 K/uL    Eos (Absolute) 0.01 0.00 - 0.51 K/uL    Baso (Absolute) 0.01 0.00 - 0.12 K/uL    Immature Granulocytes (abs) 0.02 0.00 - 0.11 K/uL    NRBC (Absolute) 0.00 K/uL   COMP METABOLIC PANEL   Result Value Ref Range    Sodium 136 135 - 145 mmol/L    Potassium 3.9 3.6 - 5.5 mmol/L    Chloride 102 96 - 112 mmol/L    Co2 23 20 - 33 mmol/L    Anion Gap 11.0 7.0 - 16.0    Glucose 121 (H) 65 - 99 mg/dL    Bun 10 8 - 22 mg/dL    Creatinine 0.51 0.50 - 1.40 mg/dL    Calcium 10.2 8.5 - 10.5 mg/dL    AST(SGOT) 23 12 - 45 U/L    ALT(SGPT) 19 2 - 50 U/L    Alkaline Phosphatase 85 30 - 99 U/L    Total Bilirubin 0.4 0.1 - 1.5 mg/dL    Albumin 4.4 3.2 - 4.9 g/dL    Total Protein 7.1 6.0 - 8.2 g/dL    Globulin 2.7 1.9 - 3.5 g/dL    A-G Ratio 1.6 g/dL   LIPASE   Result Value Ref Range    Lipase 28 11 - 82 U/L   URINALYSIS (UA)    Specimen: Urine, Clean Catch; Blood   Result Value Ref Range    Color Yellow     Character Clear     Specific Gravity 1.012 <1.035    Ph 6.5 5.0 - 8.0    Glucose Negative Negative mg/dL    Ketones Negative Negative mg/dL    Protein Negative Negative mg/dL    Bilirubin Negative Negative    Urobilinogen, Urine 0.2 Negative    Nitrite Negative Negative    Leukocyte Esterase  Negative Negative    Occult Blood Negative Negative    Micro Urine Req see below    ESTIMATED GFR   Result Value Ref Range    GFR If African American >60 >60 mL/min/1.73 m 2    GFR If Non African American >60 >60 mL/min/1.73 m 2       RADIOLOGY/PROCEDURES  CT-ABDOMEN-PELVIS WITH   Final Result      1.  Solid enhancing mass in the left lobe of the liver is unchanged compared to the prior CT from 2014. Focal nodular hyperplasia or adenoma are most likely.      2.  No new or acute abnormalities are identified. No CT evidence of appendicitis at this time.      3.  Mild diverticulosis.             COURSE & MEDICAL DECISION MAKING  Pertinent Labs & Imaging studies reviewed. (See chart for details)  Patient presents the emergency department with right lower quadrant abdominal pain.  Patient is afebrile.  No history of vomiting or diarrhea.  No dysuria.  Differential includes possible ovarian pathology versus appendicitis versus diverticulitis.  Patient is not having urinary symptoms and therefore I think UTI or kidney stone is less likely.  Her pain seems worse with movement and therefore this almost seems somewhat musculoskeletal.  Ambulation makes it worse.  Patient does not have a skin rash and therefore I do not suspect shingles but perhaps the rash has not appeared yet.    CT scan is returned to does not show any obvious abnormalities in the right lower quadrant.  Patient has normal white count without evidence of bandemia.  She has some mild anemia with a hemoglobin of 11.3.  Electrolytes show no significant abnormalities.  Urine shows no evidence of infection or blood.  Patient is still having pain in that right lower quadrant.  This point it is unclear what is causing the pain.  I believe she stable for outpatient management with close follow-up.  She is advised that this could be an early appendicitis.  She is advised to return in 12 hours for recheck if her pain continues or gets worse.  I will cover her with a  few Norco.  She is also advised to return if she notices a rash, develops a fever or any new or different symptoms.  Daughter and patient understand the plan.    In prescribing controlled substances to this patient, I certify that I have obtained and reviewed the medical history of Zoran Degroot. I have also made a good carlos effort to obtain applicable records from other providers who have treated the patient and no other records are available at this time.     I have conducted a physical exam and documented it. I have reviewed Ms. Ray Degroot’s prescription history as maintained by the [place] Prescription Monitoring Program.     I have assessed the patient’s risk for abuse, dependency, and addiction using the validated Opioid Risk Tool available at https://www.mdcalc.com/accpzs-xvwn-qswx-ort-narcotic-abuse.     Given the above, I believe the benefits of controlled substance therapy outweigh the risks. The reasons for prescribing controlled substances include non-narcotic, oral analgesic alternatives have been inadequate for pain control. Accordingly, I have discussed the risk and benefits, treatment plan, and alternative therapies with the patient.       FINAL IMPRESSION     1. Right lower quadrant abdominal pain             Electronically signed by: Edwina Ventura M.D., 6/6/2020 1:07 PM

## 2020-06-06 NOTE — DISCHARGE INSTRUCTIONS
THE EXACT CAUSE OF YOUR PAIN IS UNCLEAR--THIS MIGHT BE EARLY IN THE DISEASE PROCESS AND WE ARE UNABLE TO IDENTIFY IT AT THIS TIME (SUCH AS EARLY APPENDICITIS, FOR EXAMPLE).  SEE YOUR DOCTOR OR RETURN TO ER IN THE MORNING (OR 8-12 HRS) UNLESS YOU ARE FEELING COMPLETELY BETTER, RETURN SOONER FOR PAIN/NAUSEA NOT CONTROLLED BY MEDS, FEVER, ANY OTHER CONCERN.

## 2020-06-06 NOTE — ED TRIAGE NOTES
.  Chief Complaint   Patient presents with   • Abdominal Pain     RLQ/Right inguinal region.  Onset yesterday morning 6/5.   Sudden onset right inguinal region pain.  Pt reports increased pain upon ambulation. No N/V/D.  No BM or UOP changes.  No trauma.  Region TTP.    Pt educated on the triage process.  Pt was instructed to notify staff for any worsening symptoms.  Pt denies any recent travel, denies exposure to COVID positive individuals.  Pt also denies any respiratory or flu like symptoms at this time.

## 2022-06-01 ENCOUNTER — PHYSICAL THERAPY (OUTPATIENT)
Dept: PHYSICAL THERAPY | Facility: REHABILITATION | Age: 62
End: 2022-06-01
Attending: STUDENT IN AN ORGANIZED HEALTH CARE EDUCATION/TRAINING PROGRAM
Payer: COMMERCIAL

## 2022-06-01 DIAGNOSIS — M17.11 UNILATERAL PRIMARY OSTEOARTHRITIS, RIGHT KNEE: ICD-10-CM

## 2022-06-01 DIAGNOSIS — M17.9 OSTEOARTHRITIS OF KNEE, UNSPECIFIED: ICD-10-CM

## 2022-06-01 PROCEDURE — 97162 PT EVAL MOD COMPLEX 30 MIN: CPT

## 2022-06-01 PROCEDURE — 97110 THERAPEUTIC EXERCISES: CPT

## 2022-06-01 SDOH — ECONOMIC STABILITY: GENERAL: QUALITY OF LIFE: GOOD

## 2022-06-01 ASSESSMENT — ENCOUNTER SYMPTOMS
EXACERBATED BY: WALKING
ALLEVIATING FACTORS: HEAT APPLICATION
QUALITY: ACHING
PAIN SCALE: 1
EXACERBATED BY: PROLONGED STANDING
PAIN SCALE AT LOWEST: 1
EXACERBATED BY: SQUATTING
PAIN TIMING: INTERMITTENT
PAIN TIMING: OCCASIONAL
PAIN SCALE AT HIGHEST: 6
EXACERBATED BY: BENDING
ALLEVIATING FACTORS: POSITION CHANGE
QUALITY: SHOOTING
EXACERBATED BY: STAIR CLIMBING
EXACERBATED BY: KNEELING
ALLEVIATING FACTORS: PAIN MEDICATION

## 2022-06-01 ASSESSMENT — ACTIVITIES OF DAILY LIVING (ADL): POOR_BALANCE: 1

## 2022-06-01 NOTE — OP THERAPY EVALUATION
Outpatient Physical Therapy  INITIAL EVALUATION    [facility] Physical Therapy [place] [facility], [address]  [place] [address]  Phone:  [phone]    Date of Evaluation: 2022    Patient: Zoran Degroot  YOB: 1960  MRN: [id number]     Referring Provider: Eloy Negron M.D.  [address]  [address]  [place] [address]   Referring Diagnosis Unilateral primary osteoarthritis, right knee [M17.11];Osteoarthritis of knee, unspecified [M17.9]     Time Calculation  Start time: 0215  Stop time: 0300 Time Calculation (min): 45 minutes         Chief Complaint: Difficulty Walking and Loss Of Balance    Visit Diagnoses     ICD-10-CM   1. Unilateral primary osteoarthritis, right knee  M17.11   2. Osteoarthritis of knee, unspecified  M17.9       Date of onset of impairment: 2022    Subjective:   History of Present Illness:     Date of onset:  2022    Mechanism of injury:  The patient is a 61 year old female who reports (R) knee pain over the last ~12 years when it began; she explains pain begins at about noon time. The patient has increased pain while walking quickly or standing too long during work. She does not get any exercise after work.  Quality of life:  Good  Headaches:  no headaches  Sleep disturbance:  Not disrupted  Pain:     Current pain ratin    At best pain ratin    At worst pain ratin    Quality:  Aching and shooting    Pain timing:  Intermittent and occasional    Relieving factors:  Heat application, pain medication and position change    Aggravating factors:  Squatting, walking, stair climbing, prolonged standing, kneeling and bending  Social Support:     Lives in:  One-story house  Hand dominance:  Right  Treatments:     Previous treatment:  Injection treatment    Current treatment:  Medication and rest  Patient Goals:     Patient goals for therapy:  Decreased pain, increased motion, increased strength and improved balance    Other  patient goals:  Increase balance and improve gait pattern walking; decrease pain      Past Medical History:   Diagnosis Date   • Diabetes    • Diabetes (HCC)    • Hyperlipidemia    • Hypertension      Past Surgical History:   Procedure Laterality Date   • OTHER CARDIAC SURGERY      AT AGE 10 YRS     Social History     Tobacco Use   • Smoking status: Never Smoker   • Smokeless tobacco: Never Used   Substance Use Topics   • Alcohol use: No     Family and Occupational History     Socioeconomic History   • Marital status:      Spouse name: Not on file   • Number of children: Not on file   • Years of education: Not on file   • Highest education level: Not on file   Occupational History   • Not on file       Objective     Tenderness     Right Knee   Tenderness in the medial joint line.     Active Range of Motion   Left Knee   Flexion: 136 degrees   Extension: 0 degrees     Right Knee   Flexion: 130 degrees   Extension: 5 degrees     Patellar Mobility   Left Knee Patellar tendons within functional limits include the medial, lateral, superior and inferior.     Right Knee Patellar tendons within functional limits include the medial, lateral, superior and inferior.     Strength:      Left Knee   Flexion: 5  Extension: 5  Quadriceps contraction: good    Right Knee   Flexion: 4+  Extension: 4+  Quadriceps contraction: fair    Tests     Right Knee   Negative anterior drawer, Apley's compression, lateral Minoo, medial Minoo, patellar compression and posterior drawer.   Ambulation     Observational Gait   Gait: asymmetric   Increased left stance time. Decreased walking speed, stride length, right stance time, left swing time, right swing time, left step length and right step length. Stride width: narrow.    Left foot contact pattern: heel to toe  Right foot contact pattern: foot flat  Left arm swing: decreased  Right arm swing: decreased    Functional Assessment   Squat   Pain and trunk lean left.     Single Leg Squat      Right Leg  Pain and left trunk side bending.     Single Leg Stance   Left: 10 seconds  Right: 4 seconds        Therapeutic Exercises (CPT 81956):     1. SLR's (R) LE, 1 x10 reps    2. Sit to stands, 1 x11 reps in 60 sec    3. Nu step bike     Therapeutic Treatments and Modalities:     1. Manual Therapy (CPT 78036), (R) knee , (R) tibiofemoral jt mobs AP's, PA's     Time-based treatments/modalities:    Physical Therapy Timed Treatment Charges  Manual therapy minutes (CPT 70506): 5 minutes  Therapeutic exercise minutes (CPT 87229): 10 minutes      Assessment, Response and Plan:   Impairments: activity intolerance, impaired balance, lacks appropriate home exercise program, limited mobility, pain with function and weight-bearing intolerance    Assessment details:  The patient is a 61 year old female who reports increased (R) knee pain with prolonged walking standing or quick walking activity. The patient would benefit from skilled physical therapy to address chronic (R) knee pain and OA with PROM/AROM, manual therapy techniques, strength and conditioning, gait and balance training, activity tolerance and functional training.  Barriers to therapy:  None  Prognosis: good    Goals:   Short Term Goals:   1) Indep with HEP  2) Able to increase (R) knee flexion by 5-10 deg   3) Performs household ADL's 50% or more with less pain   Short term goal time span:  2-4 weeks      Long Term Goals:    1) Progression/regression with HEP   2) Performs activity from squatting positions 50%- 75% of the time without/ with less pain.  3) Completes shopping with prolonged periods on her feet weighbearing with less (R) knee pain by 1-2 levels on VAS.  Long term goal time span:  4-6 weeks    Plan:   Therapy options:  Physical therapy treatment to continue  Planned therapy interventions:  Neuromuscular Re-education (CPT 74756), Self Care ADL Training (CPT 60549), Therapeutic Activities (CPT 66717) and Therapeutic Exercise (CPT  48485)  Frequency:  1x week  Duration in weeks:  10  Duration in visits:  10  Discussed with:  Patient      Functional Assessment Used        Referring provider co-signature:  I have reviewed this plan of care and my co-signature certifies the need for services.    Certification Period: 06/01/2022 to  08/10/22    Physician Signature: ________________________________ Date: ______________

## 2022-06-22 ENCOUNTER — PHYSICAL THERAPY (OUTPATIENT)
Dept: PHYSICAL THERAPY | Facility: REHABILITATION | Age: 62
End: 2022-06-22
Attending: STUDENT IN AN ORGANIZED HEALTH CARE EDUCATION/TRAINING PROGRAM
Payer: COMMERCIAL

## 2022-06-22 DIAGNOSIS — M17.11 UNILATERAL PRIMARY OSTEOARTHRITIS, RIGHT KNEE: ICD-10-CM

## 2022-06-22 DIAGNOSIS — M17.9 OSTEOARTHRITIS OF KNEE, UNSPECIFIED: ICD-10-CM

## 2022-06-22 PROCEDURE — 97140 MANUAL THERAPY 1/> REGIONS: CPT

## 2022-06-22 PROCEDURE — 97110 THERAPEUTIC EXERCISES: CPT

## 2022-06-22 NOTE — OP THERAPY DAILY TREATMENT
Outpatient Physical Therapy  DAILY TREATMENT     [facility] Outpatient Physical Therapy [address] [facility], [address]  [place] [address]  Phone:  [phone]    Date: 06/22/2022    Patient: Zoran Degroot  YOB: 1960  MRN: [id number]     Time Calculation    Start time: 0130  Stop time: 0215 Time Calculation (min): 45 minutes         Chief Complaint: Knee Problem and Difficulty Walking    Visit #: 2    SUBJECTIVE:  The patient reports continued (R) knee pain with increased weightbearing     OBJECTIVE:  Current objective measures:       Decrease (R) knee pain; increase mobility in knee flexion /extension     Therapeutic Exercises (CPT 93310):     1. SLR's (R) LE, 1 x 20 reps    2. Sit to stands, 1 x11 reps in 60 sec    3. Nu step bike , seat 15 L2.5 at 8:00    4. Quad extensions, 2 x10 reps (orange tb)    5. Hamstring curls, 2 x10 reps (orange tb)    6. Bridges/ hamstring curls with ball, 20x each    7. Leg press    8. Forward lunges/lateral lunges     Therapeutic Treatments and Modalities:     1. Manual Therapy (CPT 34814), (R) knee , (R) tibiofemoral jt mobs AP's, PA's grade 2-3 ; overpressure with (R)LE     Time-based treatments/modalities:    Physical Therapy Timed Treatment Charges  Manual therapy minutes (CPT 99422): 20 minutes  Therapeutic exercise minutes (CPT 06579): 25 minutes      ASSESSMENT:   Response to treatment:   (R) tibiofemoral joint mobs AP's PA's grade 3; no pain; tibio-inversion/eversion grade 2-3 no pain; overpressure with tibiofemoral joint; no pain; PROM in (R) knee flexion ~140 deg without pain. Patient educated on pre-operative AROM and strengthening exercises for (R) knee. Patient tolerated well. Patient experienced increased fatigue and weakness with quad extensions using light resistive band.       PLAN/RECOMMENDATIONS:   Plan for treatment: therapy treatment to continue next visit.  Planned interventions for next visit: continue with current treatment.

## 2022-06-29 ENCOUNTER — PHYSICAL THERAPY (OUTPATIENT)
Dept: PHYSICAL THERAPY | Facility: REHABILITATION | Age: 62
End: 2022-06-29
Attending: STUDENT IN AN ORGANIZED HEALTH CARE EDUCATION/TRAINING PROGRAM
Payer: COMMERCIAL

## 2022-06-29 DIAGNOSIS — M17.9 OSTEOARTHRITIS OF KNEE, UNSPECIFIED: ICD-10-CM

## 2022-06-29 DIAGNOSIS — M17.11 UNILATERAL PRIMARY OSTEOARTHRITIS, RIGHT KNEE: ICD-10-CM

## 2022-06-29 PROCEDURE — 97140 MANUAL THERAPY 1/> REGIONS: CPT

## 2022-06-29 PROCEDURE — 97110 THERAPEUTIC EXERCISES: CPT

## 2022-06-29 PROCEDURE — 97112 NEUROMUSCULAR REEDUCATION: CPT

## 2022-06-29 NOTE — OP THERAPY DAILY TREATMENT
"  Outpatient Physical Therapy  DAILY TREATMENT     [facility] Physical Therapy [address], [address]  [place] [address]  Phone:  [phone]    Date: 06/29/2022    Patient: Zoran Degroot  YOB: 1960  MRN: [id number]     Time Calculation    Start time: 0130  Stop time: 0215 Time Calculation (min): 45 minutes         Chief Complaint: Knee Problem and Difficulty Walking    Visit #: 3    SUBJECTIVE:  The patient reports long walking durations increases pain to the (R) knee.    OBJECTIVE:  Current objective measures:       Decrease (R) knee pain; increase strength in quads; hip extensors    Therapeutic Exercises (CPT 61022):     1. SLR's (R) LE, 1 x 20 reps    2. Sit to stands, 1 x11 reps in 60 sec    3. Nu step bike , seat 15 L2.5 at 8:30    4. Quad extensions, 2 x10 reps (orange tb)    5. Hamstring curls, 2 x10 reps (orange tb)    6. Bridges/ hamstring curls with ball, 20x each    7. Leg press    8. Forward lunges/lateral lunges     9. Squats, 1 x20 reps    10. Steps 4-6 \"    11. 3 way hip ,  2x 10 reps (green tb)    13. Heel slides, 20x    14. Hip abductions, 2 x     Therapeutic Treatments and Modalities:     1. Manual Therapy (CPT 61517), (R) knee , (R) tibiofemoral jt mobs AP's, PA's grade 2-3 ; overpressure with (R)LE     Time-based treatments/modalities:    Physical Therapy Timed Treatment Charges  Manual therapy minutes (CPT 36663): 15 minutes  Neuromusc re-ed, balance, coor, post minutes (CPT 53896): 15 minutes  Therapeutic exercise minutes (CPT 57772): 15 minutes    ASSESSMENT:   Response to treatment:   Performed manual therapy techniques to (R) patella; (R) tibiofemoral joint AP's/PA's grade 2-3; no pain; overpressure tolerated well and manual traction at (R) LE. Self care instructions for conditioning exercises for pre-post op TKA care. Patient given HEP reference sheet and directed to follow until return to physical therapy.       PLAN/RECOMMENDATIONS:   Plan for " treatment: therapy treatment to continue next visit.  Planned interventions for next visit: continue with current treatment.

## 2022-07-06 ENCOUNTER — APPOINTMENT (OUTPATIENT)
Dept: PHYSICAL THERAPY | Facility: REHABILITATION | Age: 62
End: 2022-07-06
Attending: STUDENT IN AN ORGANIZED HEALTH CARE EDUCATION/TRAINING PROGRAM
Payer: COMMERCIAL

## 2022-07-13 ENCOUNTER — PHYSICAL THERAPY (OUTPATIENT)
Dept: PHYSICAL THERAPY | Facility: REHABILITATION | Age: 62
End: 2022-07-13
Attending: STUDENT IN AN ORGANIZED HEALTH CARE EDUCATION/TRAINING PROGRAM
Payer: COMMERCIAL

## 2022-07-13 DIAGNOSIS — M17.11 UNILATERAL PRIMARY OSTEOARTHRITIS, RIGHT KNEE: ICD-10-CM

## 2022-07-13 DIAGNOSIS — M17.9 OSTEOARTHRITIS OF KNEE, UNSPECIFIED: ICD-10-CM

## 2022-07-13 PROCEDURE — 97110 THERAPEUTIC EXERCISES: CPT

## 2022-07-13 PROCEDURE — 97112 NEUROMUSCULAR REEDUCATION: CPT

## 2022-07-13 NOTE — OP THERAPY DAILY TREATMENT
"  Outpatient Physical Therapy  DAILY TREATMENT     [facility] Outpatient Physical Therapy [address] [facility], [address]  [place] [address]  Phone:  [phone]    Date: 07/13/2022    Patient: Zoran Degroot  YOB: 1960  MRN: [id number]     Time Calculation    Start time: 0130  Stop time: 0215 Time Calculation (min): 45 minutes         Chief Complaint: Difficulty Walking, Knee Problem, Knee Injury, and Loss Of Balance    Visit #: 4    SUBJECTIVE:  The patient reports that her surgery was cancelled on the 6th of July and is rescheduled for August 17th Wednesday. She continues to have pain in her (R) knee standing weightbearing long periods.    OBJECTIVE:  Current objective measures:       Increase mobility and strength to the (R) knee     Therapeutic Exercises (CPT 73237):     1. SLR's (R) LE, 2 x10 reps @ 5#    2. Sit to stands, 1 x11 reps in 60 sec    3. Nu step bike , seat 17 L3.0 at 8:30 minutes    4. Quad extensions, 2 x10 reps (orange tb)    5. Hamstring curls, 2 x10 reps (orange tb)    6. Bridges/ hamstring curls with ball, 20x each    7. Leg press, 4B @ 1 x20 reps    8. Forward lunges/lateral lunges     9. Squats, 1 x20 reps    10. Steps 4-6 \"    11. 3 way hip ,  2x 10 reps (green tb)    12. Hip abductions,  2 x10 reps @ 5#    Therapeutic Treatments and Modalities:     1. Manual Therapy (CPT 16742), (R) knee , (R) tibiofemoral jt mobs AP's, PA's grade 2-3 ; overpressure with (R)LE     Time-based treatments/modalities:    Physical Therapy Timed Treatment Charges  Manual therapy minutes (CPT 58054): 10 minutes  Neuromusc re-ed, balance, coor, post minutes (CPT 05493): 10 minutes  Therapeutic exercise minutes (CPT 56535): 25 minutes    ASSESSMENT:   Response to treatment:   (R) tibiofemoral joint AP's PA's grade 2-3; no pain; tibiofibular joint mobs no pain either. Overpressure in (R) knee extension restricted at end range ~5 deg lag but no pain. Performed strengthening for quads, hamstring and " hip extensors.      PLAN/RECOMMENDATIONS:   Plan for treatment: therapy treatment to continue next visit.  Planned interventions for next visit: continue with current treatment.

## 2022-07-20 ENCOUNTER — APPOINTMENT (OUTPATIENT)
Dept: PHYSICAL THERAPY | Facility: REHABILITATION | Age: 62
End: 2022-07-20
Attending: STUDENT IN AN ORGANIZED HEALTH CARE EDUCATION/TRAINING PROGRAM
Payer: COMMERCIAL

## 2022-07-27 ENCOUNTER — PHYSICAL THERAPY (OUTPATIENT)
Dept: PHYSICAL THERAPY | Facility: REHABILITATION | Age: 62
End: 2022-07-27
Attending: STUDENT IN AN ORGANIZED HEALTH CARE EDUCATION/TRAINING PROGRAM
Payer: COMMERCIAL

## 2022-07-27 DIAGNOSIS — M17.9 OSTEOARTHRITIS OF KNEE, UNSPECIFIED: ICD-10-CM

## 2022-07-27 DIAGNOSIS — M17.11 UNILATERAL PRIMARY OSTEOARTHRITIS, RIGHT KNEE: ICD-10-CM

## 2022-07-27 PROCEDURE — 97112 NEUROMUSCULAR REEDUCATION: CPT

## 2022-07-27 PROCEDURE — 97140 MANUAL THERAPY 1/> REGIONS: CPT

## 2022-07-27 PROCEDURE — 97110 THERAPEUTIC EXERCISES: CPT

## 2022-07-27 NOTE — OP THERAPY DAILY TREATMENT
"  Outpatient Physical Therapy  DAILY TREATMENT     [facility] Physical Therapy [address] [facility], [address]  [place] [address]  Phone:  [phone]    Date: 07/27/2022    Patient: Zoran Degroot  YOB: 1960  MRN: [id number]     Time Calculation    Start time: 0130  Stop time: 0215 Time Calculation (min): 45 minutes         Chief Complaint: Knee Problem, Difficulty Walking, and Loss Of Balance    Visit #: 5    SUBJECTIVE:  The patient reports less pain currently to the (R) knee, but when she is on her feet working the pain increases.    OBJECTIVE:  Current objective measures:       Improve (R) LE strength; decrease (R) LE pain     Therapeutic Exercises (CPT 58128):     1. SLR's (R) LE, 2 x10 reps @ 5#    2. Sit to stands, 1 x11 reps in 60 sec    3. Nu step bike , seat 17 L3.0 at 10:30 minutes    4. Quad extensions, 2 x10 reps (orange tb)    5. Hamstring curls, 2 x10 reps (orange tb)    6. Bridges/ hamstring curls with ball,  1 x10 each, with theraball 1 x10 reps     7. Leg press, 5B @ 1 x 20 reps    8. Forward lunges/lateral lunges     9. Squats, 1 x20 reps    10. Steps 4-6 \"    11. 3 way hip ,  2x 10 reps (green tb)    12. Hip abductions,  2 x10 reps @ 5#    Therapeutic Treatments and Modalities:     1. Manual Therapy (CPT 13804), (R) knee , (R) tibiofemoral jt mobs AP's, PA's grade 2-3 ; overpressure with (R)LE , prone (R) LE quad stretch static    Time-based treatments/modalities:    Physical Therapy Timed Treatment Charges  Manual therapy minutes (CPT 24258): 15 minutes  Neuromusc re-ed, balance, coor, post minutes (CPT 17256): 15 minutes  Therapeutic exercise minutes (CPT 15940): 15 minutes    ASSESSMENT:   Response to treatment:   (R) tibiofemoral joint mobilizations grade 3-4 with no pain during AP's, PA's internal rotation/external rotation. Overpressure given in (R) knee extension; no pain. Educated patient upon prone (R) quad /psoas stretch using stretch " willie      PLAN/RECOMMENDATIONS:   Plan for treatment: therapy treatment to continue next visit.  Planned interventions for next visit: continue with current treatment.

## 2022-08-10 ENCOUNTER — PHYSICAL THERAPY (OUTPATIENT)
Dept: PHYSICAL THERAPY | Facility: REHABILITATION | Age: 62
End: 2022-08-10
Attending: STUDENT IN AN ORGANIZED HEALTH CARE EDUCATION/TRAINING PROGRAM
Payer: COMMERCIAL

## 2022-08-10 DIAGNOSIS — M17.11 UNILATERAL PRIMARY OSTEOARTHRITIS, RIGHT KNEE: ICD-10-CM

## 2022-08-10 DIAGNOSIS — M17.9 OSTEOARTHRITIS OF KNEE, UNSPECIFIED: ICD-10-CM

## 2022-08-10 PROCEDURE — 97140 MANUAL THERAPY 1/> REGIONS: CPT

## 2022-08-10 PROCEDURE — 97110 THERAPEUTIC EXERCISES: CPT

## 2022-08-10 PROCEDURE — 97112 NEUROMUSCULAR REEDUCATION: CPT

## 2022-08-10 NOTE — OP THERAPY DAILY TREATMENT
"  Outpatient Physical Therapy  DAILY TREATMENT     [facility] Outpatient Physical Therapy [address] [facility], [address]  [place] [address]  Phone:  [phone]    Date: 08/10/2022    Patient: Zoran Degroot  YOB: 1960  MRN: [id number]     Time Calculation    Start time: 0215  Stop time: 0300 Time Calculation (min): 45 minutes         Chief Complaint: Knee Problem    Visit #: 6    SUBJECTIVE:  The patient reports soreness/tightness to the (R) knee     OBJECTIVE:  Current objective measures:       Improve balance coordination strength in lunge motion    Therapeutic Exercises (CPT 84469):     1. SLR's (R) LE, 2 x10 reps @ 5#    2. Sit to stands, 1 x11 reps in 60 sec    3. Nu step bike , seat 17 L3.0 at 10:30 minutes    4. quad extensions, 2 x10 reps (orange tb)    5. hamstring curls, 2 x10 reps (orange tb)    6. bridges/ hamstring curls with ball,  1 x10 each, with theraball 1 x10 reps     7. leg press, 5B @ 1 x 20 reps    8. forward lunges/lateral lunges , 2 x10 reps    9. squats, 1 x20 reps    10. steps 4-6 \", difficult pain with (R) LE    11. 3 way hip ,  2x 10 reps (green tb)    12. hip abductions,  2 x10 reps @ 5#    Therapeutic Treatments and Modalities:     1. Manual Therapy (CPT 59541), (R) knee , (R) tibiofemoral jt yanick GREENE's, PA's grade 2-3 ; overpressure with (R)LE , prone (R) LE quad stretch static  Time-based treatments/modalities:    Physical Therapy Timed Treatment Charges  Manual therapy minutes (CPT 17773): 10 minutes  Neuromusc re-ed, balance, coor, post minutes (CPT 66836): 15 minutes  Therapeutic exercise minutes (CPT 74195): 20 minutes    ASSESSMENT:   Response to treatment:   Performed functional movements kneeling and lunging with difficulty controlling balance and weakness over (R) LE getting up from ground level. Patient had difficulty stepping down over (R) LE due to pain.      PLAN/RECOMMENDATIONS:   Plan for treatment: therapy treatment to continue next visit.  Planned " interventions for next visit: continue with current treatment.

## 2022-08-17 NOTE — OP THERAPY PROGRESS SUMMARY
"  Outpatient Physical Therapy  PROGRESS SUMMARY NOTE      [facility] [place] [id number] [facility], [address]  [place] [address]  Phone:  [phone]    Date of Visit: 08/10/2022    Patient: Zoran Degroot  YOB: 1960  MRN: [id number]     Referring Provider: Eloy Negron M.D.  [address]  [address]  [place] [address]   Referring Diagnosis Unilateral primary osteoarthritis, right knee [M17.11];Osteoarthritis of knee, unspecified [M17.9]     Visit Diagnoses     ICD-10-CM   1. Unilateral primary osteoarthritis, right knee  M17.11   2. Osteoarthritis of knee, unspecified  M17.9       Rehab Potential: good    Progress Report Period: 06/01/2022 to  08/10/22    Functional Assessment Used          Objective Findings and Assessment:   Patient progression towards goals: The patient has completed 6 sessions in this initial interim of physical therapy. She has demonstrated continued follow through with her current HEP but continues to have limitations in end range of motion with (R) knee flexion due to pain and strength limitations with functional positions of movement including squatting kneeling and using steps during descending negotiation. She would continue to benefit from skilled physical therapy to address these variables for 4-6 weeks.          Objective findings and assessment details: (R) knee flexion ~132 deg; (R) knee extension ~4 deg extension; continues to need support while stepping down 4-6\" steps and during return in retropulsion due to increased (R) knee pain. Continued gait antalgia to the (R) side with short step length and increase stance time over the (R) LE     Goals:   Short Term Goals:   1) Indep with HEP: met  2) Able to increase (R) knee flexion by 5-10 deg: partially met   3) Performs household ADL's 50% or more with less pain: limited  Short term goal time span:  2-4 weeks      Long Term Goals:    1) Progression/regression with HEP: not met  "   2) Performs activity from squatting positions 50%- 75% of the time without/ with less pain: not met.  3) Completes shopping with prolonged periods on her feet weighbearing with less (R) knee pain by 1-2 levels on VAS: not met  Long term goal time span:  4-6 weeks    Plan:   Planned therapy interventions:  Neuromuscular Re-education (CPT 43838), Self Care ADL Training (CPT 41882), Therapeutic Activities (CPT 87626) and Therapeutic Exercise (CPT 67953)  Frequency:  2x week  Duration in weeks:  6  Duration in visits:  12  Plan details:  Functional Training, Self care: 30216 (1)   Neuromuscular Re-education: 18052 (1)   Therapeutic Activities: 51360 (1)   Therapeutic Exercise: 85627 (1)       Referring provider co-signature:  I have reviewed this plan of care and my co-signature certifies the need for services.     Certification Period: 08/10/2022 to 09/28/22    Physician Signature: ________________________________ Date: ______________           Statement Selected

## 2022-08-24 ENCOUNTER — PHYSICAL THERAPY (OUTPATIENT)
Dept: PHYSICAL THERAPY | Facility: REHABILITATION | Age: 62
End: 2022-08-24
Attending: STUDENT IN AN ORGANIZED HEALTH CARE EDUCATION/TRAINING PROGRAM
Payer: COMMERCIAL

## 2022-08-24 DIAGNOSIS — M17.9 OSTEOARTHRITIS OF KNEE, UNSPECIFIED: ICD-10-CM

## 2022-08-24 DIAGNOSIS — M17.11 UNILATERAL PRIMARY OSTEOARTHRITIS, RIGHT KNEE: ICD-10-CM

## 2022-08-24 PROCEDURE — 97110 THERAPEUTIC EXERCISES: CPT

## 2022-08-24 PROCEDURE — 97162 PT EVAL MOD COMPLEX 30 MIN: CPT

## 2022-08-24 SDOH — ECONOMIC STABILITY: GENERAL: QUALITY OF LIFE: GOOD

## 2022-08-24 ASSESSMENT — ENCOUNTER SYMPTOMS
EXACERBATED BY: WALKING
PAIN SCALE AT LOWEST: 2
PAIN SCALE: 4
QUALITY: THROBBING
EXACERBATED BY: BENDING
QUALITY: ACHING
ALLEVIATING FACTORS: PAIN MEDICATION
EXACERBATED BY: LIFTING
EXACERBATED BY: STANDING
EXACERBATED BY: SQUATTING
EXACERBATED BY: PROLONGED SITTING
ALLEVIATING FACTORS: COLD COMPRESS
AWAKENINGS PER NIGHT: 3
EXACERBATED BY: STAIR CLIMBING
PAIN SCALE AT HIGHEST: 4
ALLEVIATING FACTORS: COLD/ICE
PAIN TIMING: CONSTANT

## 2022-08-24 NOTE — OP THERAPY EVALUATION
Outpatient Physical Therapy  INITIAL EVALUATION    [facility] Physical Therapy [place] [id number] [facility], [address]  [place] [address]  Phone:  [phone]    Date of Evaluation: 2022    Patient: Zoran Degroot  YOB: 1960  MRN: [id number]     Referring Provider: Eloy Negron M.D.  [address]  [address]  [place] [address]   Referring Diagnosis Unilateral primary osteoarthritis, right knee [M17.11];Osteoarthritis of knee, unspecified [M17.9]     Time Calculation  Start time: 0215  Stop time: 0300 Time Calculation (min): 45 minutes         Chief Complaint: Difficulty Walking, Loss Of Balance, and Knee Problem    Visit Diagnoses     ICD-10-CM   1. Unilateral primary osteoarthritis, right knee  M17.11   2. Osteoarthritis of knee, unspecified  M17.9       Date of onset of impairment: 2022    Subjective:   History of Present Illness:     Date of onset:  2022    Mechanism of injury:  The patient is a 61 year old female post op (R) TKA on 8/15/22. Patient presents with FWW for support walking with slight antalgia with (R) LE. She is taking Tylenol for her pain at night. 400 x 3 mg. She is taking Myloxicam for her pain as well 1x /day. The patient has difficulty with stairs, sitting or standing and walking too long.  Quality of life:  Good  Headaches:  no headaches  Ear problems: none  Sleep disturbance:  Interrupted sleep  # Times/Night awakened:  3  Pain:     Current pain ratin    At best pain ratin    At worst pain ratin    Quality:  Throbbing and aching    Pain timing:  Constant    Relieving factors:  Cold/ice, cold compress and pain medication    Aggravating factors:  Lifting, bending, squatting, standing, stair climbing, walking and prolonged sitting  Social Support:     Lives with:  Spouse  Hand dominance:  Right  Treatments:     Previous treatment:  Physical therapy  Patient Goals:     Patient goals for therapy:  Increased motion, increased  strength, decreased edema, improved balance and decreased pain    Past Medical History:   Diagnosis Date    Diabetes     Diabetes (HCC)     Hyperlipidemia     Hypertension      Past Surgical History:   Procedure Laterality Date    OTHER CARDIAC SURGERY      AT AGE 10 YRS     Social History     Tobacco Use    Smoking status: Never    Smokeless tobacco: Never   Substance Use Topics    Alcohol use: No     Family and Occupational History     Socioeconomic History    Marital status:      Spouse name: Not on file    Number of children: Not on file    Years of education: Not on file    Highest education level: Not on file   Occupational History    Not on file       Objective     Observations     Right Knee   Positive for edema and effusion.     Palpation   Left   Tenderness of the vastus lateralis.     Right   Tenderness of the vastus medialis.     Tenderness     Right Knee   Tenderness in the lateral patella and medial patella.     Strength:      Left Knee   Flexion: 5  Extension: 5  Quadriceps contraction: good    Right Knee   Flexion: 4+  Prone flexion: 4  Extension: 4  Quadriceps contraction: fair    Swelling     Left Knee Girth Measurement (cm)   5 cm above joint line: 34 cm  5 cm below joint line: 31 cm    Right Knee Girth Measurement (cm)   5 cm above joint line: 46 cm  5 cm below joint line: 39 cm  Ambulation   Weight-Bearing Status   Weight-Bearing Status (Left): non-weight-bearing   Weight-Bearing Status (Right): weight-bearing as tolerated    Assistive device used: standard walker    Observational Gait   Gait: antalgic   Left stance time within functional limits. Increased right stance time. Decreased walking speed, stride length, left swing time, right swing time, left step length and right step length.   Left foot contact pattern: foot flat  Right foot contact pattern: foot flat  Left arm swing: decreased  Right arm swing: decreased  Base of support: increased    Quality of Movement During Gait    Trunk  Forward lean.       Therapeutic Exercises (CPT 57799):     1. seated heel slides, 20x    2. quad sets, 20x    3. supine heel slides, 20x    4. sit to stand, 10 reps in 60 sec, needed support with hands on table    Therapeutic Treatments and Modalities:     1. Manual Therapy (CPT 19274), (R) Knee, PROM knee flexion/extension, STM/IASTM around incisional region, patellar mobs all directions  Time-based treatments/modalities:    Physical Therapy Timed Treatment Charges  Therapeutic exercise minutes (CPT 88386): 10 minutes      Assessment, Response and Plan:   Impairments: lacks appropriate home exercise program, limited mobility, pain with function, swelling and weight-bearing intolerance    Assessment details:  The patient is a 61 year old female post op (R) TKA on 8/15/22 has difficulty with increased walking and bending to ground levels, sitting for extended periods. Patient would benefit from skilled physical therapy to address post op TKA care working on PROM/AROM, gait and balance, strength and conditioning, manual therapy techniques, functional training, activity tolerance.  Barriers to therapy:  None  Prognosis: good    Goals:   Short Term Goals:   1) Indep with HEP  2) Able to sleep with less awakenings at night by 1-2 x   3) Increased PROM by 5-15 deg knee flexion to 110 deg or greater  4) Improved (R) knee extension by 5-10 deg PROM   5) Sit to stand improved by 2-3 reps in 60 sec   Short term goal time span:  2-4 weeks      Long Term Goals:    1) Progression/regression with HEP  2) Sleeps through the night 50% or greater without awakening due to pain in (R) knee   3) AROM to WFL of 120 deg-126 or better (R) knee flexion   4) AROM in (R) knee extension to WFL or 0 deg extension   5) Able to functionally transition with no pain or need for support from sitting to standing  6) Strength to (R) VMO quads at 5/5 with good contraction  Long term goal time span:  4-6 weeks    Plan:   Therapy options:   Physical therapy treatment to continue  Planned therapy interventions:  Neuromuscular Re-education (CPT 48110), Self Care ADL Training (CPT 53076), Therapeutic Activities (CPT 19605) and Therapeutic Exercise (CPT 84807)  Frequency:  2x week  Duration in weeks:  6  Duration in visits:  12  Discussed with:  Patient  Plan details:  Functional Training, Self care: 71952 (1)   Neuromuscular Re-education: 35325 (1)   Therapeutic Activities: 44920 (1)   Therapeutic Exercise: 37069 (1)         Functional Assessment Used        Referring provider co-signature:  I have reviewed this plan of care and my co-signature certifies the need for services.    Certification Period: 08/24/2022 to  10/05/22    Physician Signature: ________________________________ Date: ______________

## 2022-09-01 ENCOUNTER — APPOINTMENT (OUTPATIENT)
Dept: PHYSICAL THERAPY | Facility: REHABILITATION | Age: 62
End: 2022-09-01
Attending: STUDENT IN AN ORGANIZED HEALTH CARE EDUCATION/TRAINING PROGRAM
Payer: COMMERCIAL

## 2022-09-01 NOTE — OP THERAPY DAILY TREATMENT
Outpatient Physical Therapy  DAILY TREATMENT     [facility] Outpatient Physical Therapy [address], [address]  [place] [address]  Phone:  [phone]    Date: 09/01/2022    Patient: Zoran Degroot  YOB: 1960  MRN: [id number]     Time Calculation                   Chief Complaint: No chief complaint on file.    Visit #: 8    SUBJECTIVE:  The patient reports     OBJECTIVE:  Current objective measures:       Improve PROM/AROM by 5-10 deg or more in knee flexion /extension     Therapeutic Exercises (CPT 46072):     1. seated heel slides, 20x    2. quad sets, 20x    3. supine heel slides, 20x    4. sit to stand, 10 reps in 60 sec, needed support with hands on table    5. bridges    6. SLR's    7. heel slides on exam table    8. prone quad stretch    Therapeutic Treatments and Modalities:     1. Manual Therapy (CPT 96080), (R) Knee, PROM knee flexion/extension, STM/IASTM around incisional region, patellar mobs all directions; overpressure in tibiofemoral knee extension    4. Gait Training (CPT 56274)  Time-based treatments/modalities:             ASSESSMENT:   Response to treatment:           PLAN/RECOMMENDATIONS:   Plan for treatment: therapy treatment to continue next visit.  Planned interventions for next visit: continue with current treatment.

## 2022-09-09 ENCOUNTER — APPOINTMENT (OUTPATIENT)
Dept: PHYSICAL THERAPY | Facility: REHABILITATION | Age: 62
End: 2022-09-09
Attending: STUDENT IN AN ORGANIZED HEALTH CARE EDUCATION/TRAINING PROGRAM
Payer: COMMERCIAL

## 2022-09-13 ENCOUNTER — PHYSICAL THERAPY (OUTPATIENT)
Dept: PHYSICAL THERAPY | Facility: REHABILITATION | Age: 62
End: 2022-09-13
Attending: STUDENT IN AN ORGANIZED HEALTH CARE EDUCATION/TRAINING PROGRAM
Payer: COMMERCIAL

## 2022-09-13 DIAGNOSIS — M17.11 UNILATERAL PRIMARY OSTEOARTHRITIS, RIGHT KNEE: ICD-10-CM

## 2022-09-13 PROCEDURE — 97110 THERAPEUTIC EXERCISES: CPT

## 2022-09-13 PROCEDURE — 97112 NEUROMUSCULAR REEDUCATION: CPT

## 2022-09-13 PROCEDURE — 97530 THERAPEUTIC ACTIVITIES: CPT

## 2022-09-13 NOTE — OP THERAPY DAILY TREATMENT
Outpatient Physical Therapy  DAILY TREATMENT     [facility] Out[facility] Physical Therapy [place] [address] [facility], [address]  [place] [address]  Phone:  [phone]    Date: 09/13/2022    Patient: Zoran Degroot  YOB: 1960  MRN: [id number]     Time Calculation    Start time: 1101  Stop time: 1143 Time Calculation (min): 42 minutes         Chief Complaint: Knee Problem and Post-Op Pain    Visit #: 2    SUBJECTIVE:  Patient reports no new complaints since last treatment session. Had 2 week follow up with Dr. Negron, who was pleased with progress. Has 6 week follow up on 9/30. Consistent with HEP.  Every day is slightly better. Presents using SPC.     OBJECTIVE:  Current objective measures:   S/p R TKA on 8/15/2022    AROM R Knee prior to mobilizations  Flexion: 90 degrees  Extension: -11 degrees    AROM R Knee after mobilizations  Flexion: 107 degrees  Extension: -7 degrees            Therapeutic Exercises (CPT 78014):     1. Supine heel slides, x 20    2. Prolonged knee extension stretch with QS, 3 x 5, 5 second holds, Added to HEP    3. Hamstring set, 10 x 5 second holds, Added to HEP    5. Hooklying hip abduction, 2 x 10, 5 second holds, Added to HEP    6. Hooklying hip adduction, 2 x 10, 5 second holds, Added to HEP    Therapeutic Treatments and Modalities:     1. Neuromuscular Re-education (CPT 39164), (R) Knee, PROM knee flexion/extension, STM/IASTM around incisional region, patellar mobs all directions    4. Therapeutic Activities (CPT 38757), Review of gait mechanics, Focus on heel strike, equal stride length. Able to decrease antalgia with cues without SPC.  Time-based treatments/modalities:    Physical Therapy Timed Treatment Charges  Neuromusc re-ed, balance, coor, post minutes (CPT 31467): 10 minutes  Therapeutic activity minutes (CPT 35346): 10 minutes  Therapeutic exercise minutes (CPT 27077): 22 minutes      ASSESSMENT:   Response to treatment: Patient responded well to passive  stretching of right knee with improvements noted in ROM. Presents with poor right quad activation, but good gait mechanics even with no AD. Will continue to focus on ROM and strength.     PLAN/RECOMMENDATIONS:   Plan for treatment: therapy treatment to continue next visit.  Planned interventions for next visit: continue with current treatment.

## 2022-09-22 ENCOUNTER — PHYSICAL THERAPY (OUTPATIENT)
Dept: PHYSICAL THERAPY | Facility: REHABILITATION | Age: 62
End: 2022-09-22
Attending: STUDENT IN AN ORGANIZED HEALTH CARE EDUCATION/TRAINING PROGRAM
Payer: COMMERCIAL

## 2022-09-22 DIAGNOSIS — M17.9 OSTEOARTHRITIS OF KNEE, UNSPECIFIED: ICD-10-CM

## 2022-09-22 DIAGNOSIS — M17.11 UNILATERAL PRIMARY OSTEOARTHRITIS, RIGHT KNEE: ICD-10-CM

## 2022-09-22 PROCEDURE — 97530 THERAPEUTIC ACTIVITIES: CPT

## 2022-09-22 PROCEDURE — 97112 NEUROMUSCULAR REEDUCATION: CPT

## 2022-09-22 PROCEDURE — 97140 MANUAL THERAPY 1/> REGIONS: CPT

## 2022-09-22 NOTE — OP THERAPY DAILY TREATMENT
Outpatient Physical Therapy  DAILY TREATMENT     [facility] Outpatient Physical Therapy [address], [address]  [place] [address]  Phone:  [phone]    Date: 09/22/2022    Patient: Zoran Degroot  YOB: 1960  MRN: [id number]     Time Calculation    Start time: 0800  Stop time: 0845 Time Calculation (min): 45 minutes         Chief Complaint: Knee Problem and Difficulty Walking    Visit #: 9    SUBJECTIVE:  The patient reports going up and down the stairs 3-4 times a day. Stiffness still in the am hours.    OBJECTIVE:  Current objective measures:     Increase (R) knee flexion ext/ flexion (-11; 90) increase gait pattern stance time    Therapeutic Exercises (CPT 45954):     1. Supine incline heel slides, x 15    2. Prolonged knee extension stretch with QS, 15x at 5-10 sec holds    3. Hamstring set, 10 x 5 second holds    4. calf standing, 5 x 30 sec    5. Hooklying hip abduction, 2 x 10, 5 second holds    6. Hooklying hip adduction, 2 x 10, 5 second holds    7. leg press double stance    8. prone (R) knee flexion with strap, 5 x20-30 sec    Therapeutic Treatments and Modalities:     1. Neuromuscular Re-education (CPT 12521), (R) Knee, PROM knee flexion/extension    2. E Stim Unattended (CPT 98127), (R) knee, (R) quad VMO contractract relax 10 sec on/off in supine with, patellar mobs all directions    4. Therapeutic Activities (CPT 57066), Review of gait mechanics, Focus on heel strike, equal stride length. Able to decrease antalgia with cues without SPC.  Time-based treatments/modalities:    Physical Therapy Timed Treatment Charges  Manual therapy minutes (CPT 61180): 15 minutes  Neuromusc re-ed, balance, coor, post minutes (CPT 50494): 15 minutes  Therapeutic activity minutes (CPT 57417): 15 minutes    ASSESSMENT:   Response to treatment:   PROM in prone ~ 108 deg (R) knee flexion; patient walking with slight deviation in gait with increased stance time over (L) LE.Next visit: quad  strengthening     PLAN/RECOMMENDATIONS:   Plan for treatment: therapy treatment to continue next visit.  Planned interventions for next visit: continue with current treatment.

## 2022-09-28 ENCOUNTER — PHYSICAL THERAPY (OUTPATIENT)
Dept: PHYSICAL THERAPY | Facility: REHABILITATION | Age: 62
End: 2022-09-28
Attending: STUDENT IN AN ORGANIZED HEALTH CARE EDUCATION/TRAINING PROGRAM
Payer: COMMERCIAL

## 2022-09-28 DIAGNOSIS — M17.9 OSTEOARTHRITIS OF KNEE, UNSPECIFIED: ICD-10-CM

## 2022-09-28 DIAGNOSIS — M17.11 UNILATERAL PRIMARY OSTEOARTHRITIS, RIGHT KNEE: ICD-10-CM

## 2022-09-28 PROCEDURE — 97140 MANUAL THERAPY 1/> REGIONS: CPT

## 2022-09-28 PROCEDURE — 97112 NEUROMUSCULAR REEDUCATION: CPT

## 2022-09-28 PROCEDURE — 97110 THERAPEUTIC EXERCISES: CPT

## 2022-09-28 NOTE — OP THERAPY DAILY TREATMENT
"  Outpatient Physical Therapy  DAILY TREATMENT     [facility] Out[facility] Physical Therapy [address] [facility], [address]  [place] [address]  Phone:  [phone]    Date: 09/28/2022    Patient: Zoran Degroot  YOB: 1960  MRN: [id number]     Time Calculation    Start time: 0215  Stop time: 0300 Time Calculation (min): 45 minutes         Chief Complaint: Knee Problem, Difficulty Walking, and Loss Of Balance    Visit #: 10    SUBJECTIVE:  The patient reports walking more to the mail box with less pain after the surgery. She has better sleeping patterns at night without awaking.    OBJECTIVE:  Current objective measures:       Increase PROM/AROM in (R) knee flexion/ extension       Therapeutic Exercises (CPT 60200):     1. Upright bike (seat 2), L 3  7: 00 minutes, move to seat 1 next visit    2. bridges, 1 x10 reps 10 sec; bridge with ball 1 x10 reps    3. Hamstring set, 10 x 5 second holds    4. calf standing, 5 x 30 sec    5. Hooklying hip abduction, 1 x 10, 5 second holds    6. Hooklying hip adduction, 1  x 10, 5 second holds    7. leg press double stance, 5B double stance 1 x20 reps    8. prone (R) knee flexion with strap, 5 x20-30 sec    9. 4-6\" steps, 3x, (R)E knee pain while stepping up over box step    Therapeutic Treatments and Modalities:     1. Manual Therapy (CPT 94961), (R) Knee, PROM knee flexion/extension, overpressure to tibiofemoral    4. Neuromuscular Re-education (CPT 26860), Review of gait mechanics, Improved gait speed stance time over (R) LE and step length  Time-based treatments/modalities:    Physical Therapy Timed Treatment Charges  Manual therapy minutes (CPT 98853): 15 minutes  Neuromusc re-ed, balance, coor, post minutes (CPT 44240): 15 minutes  Therapeutic exercise minutes (CPT 05789): 15 minutes  ASSESSMENT:   Response to treatment:   AROM ~ 111 deg in knee flexion; -2 deg knee extension to (R) knee. Improved gait pattern in stance time over (R) LE; step length better more " "symmetrical compared to contralateral. Patient performed functional stepping over 4-6\" step with unsteadiness over (R) LE  and pain stepping up over platform.      PLAN/RECOMMENDATIONS:   Plan for treatment: therapy treatment to continue next visit.  Planned interventions for next visit: continue with current treatment.         "

## 2022-10-04 ENCOUNTER — PHYSICAL THERAPY (OUTPATIENT)
Dept: PHYSICAL THERAPY | Facility: REHABILITATION | Age: 62
End: 2022-10-04
Attending: STUDENT IN AN ORGANIZED HEALTH CARE EDUCATION/TRAINING PROGRAM
Payer: COMMERCIAL

## 2022-10-04 DIAGNOSIS — M17.11 UNILATERAL PRIMARY OSTEOARTHRITIS, RIGHT KNEE: ICD-10-CM

## 2022-10-04 DIAGNOSIS — M17.9 OSTEOARTHRITIS OF KNEE, UNSPECIFIED: ICD-10-CM

## 2022-10-04 PROCEDURE — 97110 THERAPEUTIC EXERCISES: CPT

## 2022-10-04 PROCEDURE — 97112 NEUROMUSCULAR REEDUCATION: CPT

## 2022-10-04 PROCEDURE — 97140 MANUAL THERAPY 1/> REGIONS: CPT

## 2022-10-04 NOTE — OP THERAPY DAILY TREATMENT
"  Outpatient Physical Therapy  DAILY TREATMENT     [facility] [place] [address] [address] [facility], [address]  [place] [address]  Phone:  [phone]    Date: 10/04/2022    Patient: Zoran Degroot  YOB: 1960  MRN: [id number]     Time Calculation    Start time: 0330  Stop time: 0415 Time Calculation (min): 45 minutes         Chief Complaint: Knee Problem    Visit #: 11    SUBJECTIVE:  The patient reports less pain in the (R) knee sleeping at night still stiff to get up and walk     OBJECTIVE:  Current objective measures:       Increase (R) quad strength in functional movements stepping lunging; AROM 111 deg knee flexion      Therapeutic Exercises (CPT 60855):     1. Upright bike (seat 5), L 4  7: 00 minutes, next visit seat 4    2. bridges, 1 x10 reps 10 sec; bridge with ball 1 x10 reps    3. hamstring stretch, 10 x 5 second holds    4. calf standing, 5 x 30 sec    5. Hooklying hip abduction, 1 x 10, 5 second holds    6. Hooklying hip adduction, 1  x 10, 5 second holds    7. leg press double stance, 6B double stance 1 x20 reps    8. prone (R) knee flexion with strap, 5 x20-30 sec    9. 4-6\" steps, 3x    10. lunges 1/4-1/2, 1 x10 reps, sideway and forward 1/4 - 1/2 sidesteps    11. SLS over (R) LE 1/4 squats    12. 3 way hip    13. clamshells    Therapeutic Treatments and Modalities:     1. Manual Therapy (CPT 88503), (R) Knee, PROM knee flexion/extension, overpressure to tibiofemoral  Time-based treatments/modalities:    Physical Therapy Timed Treatment Charges  Manual therapy minutes (CPT 23646): 10 minutes  Neuromusc re-ed, balance, coor, post minutes (CPT 93158): 15 minutes  Therapeutic exercise minutes (CPT 45793): 20 minutes  ASSESSMENT:     Response to treatment:   Manual therapy techniques applied ; PA's grade 3; AP's grade 3 no pain; AROM in (L) knee flexion to ~112 deg today with connective tissue tightness but no pain. Patient still needs support for SLS weightbearing in static " and dynamic positions.      PLAN/RECOMMENDATIONS:   Plan for treatment: therapy treatment to continue next visit.  Planned interventions for next visit: continue with current treatment.

## 2022-10-18 ENCOUNTER — PHYSICAL THERAPY (OUTPATIENT)
Dept: PHYSICAL THERAPY | Facility: REHABILITATION | Age: 62
End: 2022-10-18
Attending: STUDENT IN AN ORGANIZED HEALTH CARE EDUCATION/TRAINING PROGRAM
Payer: COMMERCIAL

## 2022-10-18 DIAGNOSIS — M17.11 UNILATERAL PRIMARY OSTEOARTHRITIS, RIGHT KNEE: ICD-10-CM

## 2022-10-18 DIAGNOSIS — M17.9 OSTEOARTHRITIS OF KNEE, UNSPECIFIED: ICD-10-CM

## 2022-10-18 PROCEDURE — 97110 THERAPEUTIC EXERCISES: CPT

## 2022-10-18 PROCEDURE — 97162 PT EVAL MOD COMPLEX 30 MIN: CPT

## 2022-10-18 NOTE — OP THERAPY DAILY TREATMENT
"  Outpatient Physical Therapy  DAILY TREATMENT     [facility] [address] [facility], [address]  [place] [address]  Phone:  [phone]    Date: 10/18/2022    Patient: Zoran Degroot  YOB: 1960  MRN: [id number]     Time Calculation    Start time: 0115  Stop time: 0200 Time Calculation (min): 45 minutes         Chief Complaint: Difficulty Walking and Knee Problem    Visit #: 12    SUBJECTIVE:  The patient reports less pain at night sleeping, still stiff when she sits long and gets up from a chair. She also says her (R) knee is itchy.     OBJECTIVE:  Current objective measures:       Increase (R) knee flexion 5 deg or more     Therapeutic Exercises (CPT 81805):     1. Upright bike (seat 5), L 4  7:00 minutes    2. bridges, 1 x10 reps 10 sec; bridge with ball 1 x10 reps    3. hamstring stretch, 10 x 5 second holds, xx    4. calf stretch / calf raises, 5 x 30 sec, xx    5. Hooklying hip abduction, 1 x 10, 5 second holds    6. Hooklying hip adduction, 1  x 10, 5 second holds    7. leg press double stance, 6B double stance 1 x20 reps    8. prone (R) knee flexion with strap, 5 x20-30 sec, xx    9. 4-6\" steps, 3x    10. lunges 1/4-1/2, 1 x10 reps, sideway and forward 1/4 - 1/2 sidesteps    11. SLS over (R) LE 1/4 squats    12. 3 way hip    13. clamshells    Therapeutic Treatments and Modalities:     1. Manual Therapy (CPT 13933), (R) Knee, PROM knee flexion/extension, overpressure to tibiofemoraljoint grade 3  Time-based treatments/modalities:    Physical Therapy Timed Treatment Charges  Therapeutic exercise minutes (CPT 22974): 10 minutes      ASSESSMENT:   Response to treatment:   PROM /AROM to the (R) knee flexion in prone ~117 deg with overpressure PROM; AROM in prone ~113 deg; patient demonstrated SLS over (R) LE with 1/4 squat but was unsteady; needed support for stability; demonstrated kneeling onto knees and getting back up; struggled using (R) LE poor support; weakness in " the hip extensors. Gait pattern has improved in all areas of step length stance time over (R) LE; pace; stride and width of gait pattern.    PLAN/RECOMMENDATIONS:   Plan for treatment: therapy treatment to continue next visit.  Planned interventions for next visit: continue with current treatment.

## 2022-10-31 NOTE — OP THERAPY PROGRESS SUMMARY
"  Outpatient Physical Therapy  PROGRESS SUMMARY NOTE      [facility] [place] [address] [facility], [address]  [place] [address]  Phone:  [phone]    Date of Visit: 09/28/2022    Patient: Zoran Degroot  YOB: 1960  MRN: [id number]     Referring Provider: Eloy Ngeron M.D.  [address]  [address]  [place] [address]   Referring Diagnosis Unilateral primary osteoarthritis, right knee [M17.11];Osteoarthritis of knee, unspecified [M17.9]     Visit Diagnoses     ICD-10-CM   1. Unilateral primary osteoarthritis, right knee  M17.11   2. Osteoarthritis of knee, unspecified  M17.9       Rehab Potential: good    Progress Report Period: 08/24/2022 to  10/05/22    Functional Assessment Used          Objective Findings and Assessment:   Patient progression towards goals: The patient has been improving in functional activity since initiating physical therapy and has increased her activity but continues to have limitations in PROM/AROM I the (R) knee. She is walking more distance but is still having unsteadiness and weakness over variable surfaces     Objective findings and assessment details: AROM ~ 111 deg in knee flexion; -2 deg knee extension to (R) knee. Improved gait pattern in stance time over (R) LE; step length better more symmetrical compared to contralateral. Patient performed functional stepping over 4-6\" step with unsteadiness over (R) LE  and pain stepping up over platform.       Goals:   Short Term Goals:   1) Indep with HEP  2) Able to sleep with less awakenings at night by 1-2 x   3) Increased PROM by 5-15 deg knee flexion to 110 deg or greater  4) Improved (R) knee extension by 5-10 deg PROM   5) Sit to stand improved by 2-3 reps in 60 sec       Long Term Goals:    1) Progression/regression with HEP  2) Sleeps through the night 50% or greater without awakening due to pain in (R) knee   3) AROM to WFL of 120 deg-126 or better (R) knee flexion   4) AROM in " (R) knee extension to WFL or 0 deg extension   5) Able to functionally transition with no pain or need for support from sitting to standing  6) Strength to (R) VMO quads at 5/5 with good contraction    Plan:   Planned therapy interventions:  Neuromuscular Re-education (CPT 50815), Self Care ADL Training (CPT 26580), Therapeutic Activities (CPT 41474) and Therapeutic Exercise (CPT 27204)  Frequency:  2x week  Duration in weeks:  6  Duration in visits:  12  Plan details:  Functional Training, Self care: 07771 (1)   Neuromuscular Re-education: 49845 (1)   Therapeutic Activities: 15207 (1)   Therapeutic Exercise: 80783 (1)         Referring provider co-signature:  I have reviewed this plan of care and my co-signature certifies the need for services.     Certification Period: 09/28/2022 to 12/12/22    Physician Signature: ________________________________ Date: ______________

## 2022-11-01 ENCOUNTER — APPOINTMENT (OUTPATIENT)
Dept: PHYSICAL THERAPY | Facility: REHABILITATION | Age: 62
End: 2022-11-01
Attending: STUDENT IN AN ORGANIZED HEALTH CARE EDUCATION/TRAINING PROGRAM
Payer: COMMERCIAL

## 2022-11-10 ENCOUNTER — APPOINTMENT (OUTPATIENT)
Dept: PHYSICAL THERAPY | Facility: REHABILITATION | Age: 62
End: 2022-11-10
Attending: STUDENT IN AN ORGANIZED HEALTH CARE EDUCATION/TRAINING PROGRAM
Payer: COMMERCIAL

## 2022-11-17 ENCOUNTER — APPOINTMENT (OUTPATIENT)
Dept: PHYSICAL THERAPY | Facility: REHABILITATION | Age: 62
End: 2022-11-17
Attending: STUDENT IN AN ORGANIZED HEALTH CARE EDUCATION/TRAINING PROGRAM
Payer: COMMERCIAL